# Patient Record
Sex: FEMALE | Race: WHITE | NOT HISPANIC OR LATINO | Employment: OTHER | URBAN - METROPOLITAN AREA
[De-identification: names, ages, dates, MRNs, and addresses within clinical notes are randomized per-mention and may not be internally consistent; named-entity substitution may affect disease eponyms.]

---

## 2017-01-05 ENCOUNTER — ALLSCRIPTS OFFICE VISIT (OUTPATIENT)
Dept: OTHER | Facility: OTHER | Age: 62
End: 2017-01-05

## 2017-01-13 ENCOUNTER — LAB REQUISITION (OUTPATIENT)
Dept: LAB | Facility: HOSPITAL | Age: 62
End: 2017-01-13
Payer: COMMERCIAL

## 2017-01-13 DIAGNOSIS — G89.29 OTHER CHRONIC PAIN: ICD-10-CM

## 2017-01-13 DIAGNOSIS — M48.02 SPINAL STENOSIS OF CERVICAL REGION: ICD-10-CM

## 2017-01-13 PROCEDURE — 80307 DRUG TEST PRSMV CHEM ANLYZR: CPT | Performed by: FAMILY MEDICINE

## 2017-01-13 PROCEDURE — CPT80301 HB MISC LAB SENDOUT: Performed by: FAMILY MEDICINE

## 2017-01-18 ENCOUNTER — GENERIC CONVERSION - ENCOUNTER (OUTPATIENT)
Dept: OTHER | Facility: OTHER | Age: 62
End: 2017-01-18

## 2017-01-18 LAB
AMPHETAMINES UR QL SCN: NEGATIVE NG/ML
BARBITURATES UR QL SCN: NEGATIVE NG/ML
BENZODIAZ UR QL SCN: POSITIVE
BZE UR QL SCN: NEGATIVE NG/ML
CANNABINOIDS UR QL SCN: POSITIVE
METHADONE UR QL SCN: NEGATIVE NG/ML
OPIATES UR QL: POSITIVE
PCP UR QL: NEGATIVE NG/ML
PROPOXYPH UR QL: NEGATIVE NG/ML

## 2017-01-25 LAB
OXYCODONE+OXYMORPHONE UR QL SCN: NORMAL
OXYCODONE+OXYMORPHONE UR QL SCN: NORMAL NG/ML

## 2017-02-15 ENCOUNTER — ALLSCRIPTS OFFICE VISIT (OUTPATIENT)
Dept: OTHER | Facility: OTHER | Age: 62
End: 2017-02-15

## 2017-05-02 ENCOUNTER — ALLSCRIPTS OFFICE VISIT (OUTPATIENT)
Dept: OTHER | Facility: OTHER | Age: 62
End: 2017-05-02

## 2017-05-20 ENCOUNTER — GENERIC CONVERSION - ENCOUNTER (OUTPATIENT)
Dept: OTHER | Facility: OTHER | Age: 62
End: 2017-05-20

## 2017-06-06 ENCOUNTER — ALLSCRIPTS OFFICE VISIT (OUTPATIENT)
Dept: OTHER | Facility: OTHER | Age: 62
End: 2017-06-06

## 2017-06-06 DIAGNOSIS — Z12.31 ENCOUNTER FOR SCREENING MAMMOGRAM FOR MALIGNANT NEOPLASM OF BREAST: ICD-10-CM

## 2017-06-06 DIAGNOSIS — R63.5 ABNORMAL WEIGHT GAIN: ICD-10-CM

## 2017-06-21 ENCOUNTER — GENERIC CONVERSION - ENCOUNTER (OUTPATIENT)
Dept: OTHER | Facility: OTHER | Age: 62
End: 2017-06-21

## 2017-07-12 ENCOUNTER — DOCTOR'S OFFICE (OUTPATIENT)
Dept: URBAN - NONMETROPOLITAN AREA CLINIC 1 | Facility: CLINIC | Age: 62
Setting detail: OPHTHALMOLOGY
End: 2017-07-12
Payer: COMMERCIAL

## 2017-07-12 DIAGNOSIS — F17.200: ICD-10-CM
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
## 2017-07-12 DIAGNOSIS — H25.13: ICD-10-CM

## 2017-07-12 DIAGNOSIS — G43.809: ICD-10-CM

## 2017-07-12 DIAGNOSIS — Z79.891: ICD-10-CM

## 2017-07-12 PROCEDURE — NO CHARGE N/C PROFESSIONAL COURTESY: Performed by: OPHTHALMOLOGY

## 2017-07-12 ASSESSMENT — REFRACTION_MANIFEST
OS_VA1: 20/
OD_ADD: +2.50
OD_CYLINDER: -1.00
OD_VA3: 20/
OS_VA2: 20/
OS_VA3: 20/
OU_VA: 20/
OD_VA3: 20/
OS_VA3: 20/
OS_VA1: 20/25
OS_VA2: 20/
OD_VA2: 20/20
OD_VA3: 20/
OU_VA: 20/
OU_VA: 20/
OS_SPHERE: PL
OD_SPHERE: -2.75
OD_VA2: 20/
OD_AXIS: 160
OS_VA1: 20/
OD_VA1: 20/
OS_VA3: 20/
OS_CYLINDER: -2.50
OD_VA1: 20/
OS_ADD: +2.50
OD_VA1: 20/25
OS_AXIS: 180
OS_VA2: 20/20
OD_VA2: 20/

## 2017-07-12 ASSESSMENT — REFRACTION_AUTOREFRACTION
OS_SPHERE: +0.75
OS_CYLINDER: -2.50
OD_SPHERE: -1.75
OD_CYLINDER: -1.50
OD_AXIS: 161
OS_AXIS: 170

## 2017-07-12 ASSESSMENT — REFRACTION_CURRENTRX
OS_AXIS: 180
OD_OVR_VA: 20/
OS_VPRISM_DIRECTION: SV
OD_VPRISM_DIRECTION: SV
OS_OVR_VA: 20/
OD_SPHERE: -2.75
OS_SPHERE: -0.25
OS_OVR_VA: 20/
OS_OVR_VA: 20/
OD_OVR_VA: 20/
OS_CYLINDER: -2.75
OD_AXIS: 175
OD_OVR_VA: 20/
OD_CYLINDER: -1.75

## 2017-07-12 ASSESSMENT — VISUAL ACUITY
OS_BCVA: 20/150
OD_BCVA: 20/30-2

## 2017-07-12 ASSESSMENT — SPHEQUIV_DERIVED
OD_SPHEQUIV: -2.5
OS_SPHEQUIV: -0.5
OD_SPHEQUIV: -3.25

## 2017-09-19 ENCOUNTER — ALLSCRIPTS OFFICE VISIT (OUTPATIENT)
Dept: OTHER | Facility: OTHER | Age: 62
End: 2017-09-19

## 2017-10-13 ENCOUNTER — GENERIC CONVERSION - ENCOUNTER (OUTPATIENT)
Dept: OTHER | Facility: OTHER | Age: 62
End: 2017-10-13

## 2017-10-19 ENCOUNTER — GENERIC CONVERSION - ENCOUNTER (OUTPATIENT)
Dept: OTHER | Facility: OTHER | Age: 62
End: 2017-10-19

## 2017-11-03 ENCOUNTER — GENERIC CONVERSION - ENCOUNTER (OUTPATIENT)
Dept: OTHER | Facility: OTHER | Age: 62
End: 2017-11-03

## 2017-12-11 ENCOUNTER — ALLSCRIPTS OFFICE VISIT (OUTPATIENT)
Dept: OTHER | Facility: OTHER | Age: 62
End: 2017-12-11

## 2017-12-11 DIAGNOSIS — C34.90 MALIGNANT NEOPLASM OF UNSPECIFIED PART OF UNSPECIFIED BRONCHUS OR LUNG (HCC): ICD-10-CM

## 2017-12-12 NOTE — PROGRESS NOTES
Assessment    1  Hypertension (401 9) (I10)  2  Lung cancer (162 9) (C34 90)  3  Generalized osteoarthritis of multiple sites (715 09) (M15 9)    Plan  Acute exacerbation of chronic obstructive airways disease with asthma    · Stop: Azithromycin 250 MG Oral Tablet  COPD (chronic obstructive pulmonary disease)    · Stop: Benzonatate 100 MG Oral Capsule  Generalized osteoarthritis of multiple sites, Knee pain, right    · Renew: Morphine Sulfate ER 15 MG Oral Tablet Extended Release; Take 1 tablet every 4hours for cancer related pain  Knee pain, right    · Renew: OxyCODONE HCl - 30 MG Oral Tablet; TAKE 1 TABLET EVERY 4 HOURS ASNEEDED FOR PAIN    Discussion/Summary  Possible side effects of new medications were reviewed with the patient/guardian today  The treatment plan was reviewed with the patient/guardian  The patient/guardian understands and agrees with the treatment plan      Chief Complaint  FOLLOW UP   Patient is here today for follow up of chronic conditions described in HPI  History of Present Illness  Her mother is in the hospital  She is tearful  This is very difficulty for her  As usual, she has a long rambling history involving family members that I have not met   has long-standing chronic pain  She has cervical stenosis and Charcot joints  She states that her pain is not well controlled on her current regimen  She was 1  on 60 mg of Morphine and 180 mg of oxycodone daily  She states that she missed a number of doses of morphine and discontinued them on her own  1  She has opioid induced constipation  She 1  has fair results with Movantik  follows with oncology and radiation oncology for her history of lung CA  She has no weight loss and no hemoptysis  She requests we order a PET scan for f/u 1        1 Amended By: Laura Wagner; Dec 11 2017 11:08 AM EST    Review of Systems   Constitutional: No fever, no chills, feels well, no tiredness, no recent weight gain or weight loss    Eyes: No complaints of eye pain, no red eyes, no eyesight problems, no discharge, no dry eyes, no itching of eyes  ENT: no complaints of earache, no loss of hearing, no nose bleeds, no nasal discharge, no sore throat, no hoarseness  Cardiovascular: No complaints of slow heart rate, no fast heart rate, no chest pain, no palpitations, no leg claudication, no lower extremity edema  Respiratory: No complaints of shortness of breath, no wheezing, no cough, no SOB on exertion, no orthopnea, no PND  Gastrointestinal: No complaints of abdominal pain, no constipation, no nausea or vomiting, no diarrhea, no bloody stools  Genitourinary: No complaints of dysuria, no incontinence, no pelvic pain, no dysmenorrhea, no vaginal discharge or bleeding  Musculoskeletal: No complaints of arthralgias, no myalgias, no joint swelling or stiffness, no limb pain or swelling  Integumentary: No complaints of skin rash or lesions, no itching, no skin wounds, no breast pain or lump  Neurological: No complaints of headache, no confusion, no convulsions, no numbness, no dizziness or fainting, no tingling, no limb weakness, no difficulty walking  Psychiatric: Not suicidal, no sleep disturbance, no anxiety or depression, no change in personality, no emotional problems  Endocrine: No complaints of proptosis, no hot flashes, no muscle weakness, no deepening of the voice, no feelings of weakness  Hematologic/Lymphatic: No complaints of swollen glands, no swollen glands in the neck, does not bleed easily, does not bruise easily  Active Problems    1  Abnormal weight gain (783 1) (R63 5)  2  Acute exacerbation of chronic obstructive airways disease with asthma (493 22) (J44 1,J45 901)  3  Acute vaginitis (616 10) (N76 0)  4  Allergic rhinitis (477 9) (J30 9)  5  Cellulitis (682 9) (L03 90)  6  Cervical stenosis of spine (723 0) (M48 02)  7  Constipation (564 00) (K59 00)  8  COPD (chronic obstructive pulmonary disease) (496) (J44 9)  9   Depression (311) (F32 9)  10  Edema (782 3) (R60 9)  11  Encounter for screening mammogram for malignant neoplasm of breast (V76 12)  (Z12 31)  12  Generalized osteoarthritis of multiple sites (715 09) (M15 9)  13  Head trauma (959 01) (S09 90XA)  14  Hypertension (401 9) (I10)  15  Hypomagnesemia (275 2) (E83 42)  16  Hyponatremia (276 1) (E87 1)  17  IBS (irritable bowel syndrome) (564 1) (K58 9)  18  Knee pain, right (719 46) (M25 561)  19  Lactose intolerance (271 3) (E73 9)  20  Lung cancer (162 9) (C34 90)  21  Lupus (695 4) (L93 0)  22  Mild protein-calorie malnutrition (263 1) (E44 1)  23  Motion sickness (994 6) (T75 3XXA)  24  Nausea (787 02) (R11 0)  25  Other chronic pain (338 29) (G89 29)  26  Other muscle spasm (728 85) (M62 838)  27  Plantar fasciitis (728 71) (M72 2)  28  Skin rash (782 1) (R21)  29  Smoking trying to quit (305 1) (Z72 0)  30  Tourette's (307 23) (F95 2)  31  Varicose veins with swelling (454 8) (U43 809)    Past Medical History  1  History of Allergic conjunctivitis (372 14) (H10 10)  2  History of Charcot's joint of shoulder (094 0,713 5) (M14 619)  3  History of acute sinusitis (V12 69) (Z87 09)  4  History of candidiasis of mouth (V12 09) (Z86 19)  5  History of malignant melanoma of skin (V10 82) (Z85 820)  6  History of migraine (V12 49) (Z86 69)  7  History of nausea (V12 79) (Z87 898)  8  History of Nausea and vomiting, vomiting of unspecified type  9  History of Need for influenza vaccination (V04 81) (Z23)  10  History of Yeast infection (112 9) (B37 9)    The active problems and past medical history were reviewed and updated today  1        1 Amended By: Gem Figueroa; Dec 11 2017 11:08 AM EST    Surgical History  1  History of Arthrodesis Cervical  2  History of Hip Replacement  3  History of Rhinoplasty  4  History of Tonsillectomy With Adenoidectomy  5  History of Total Abdominal Hysterectomy With Removal Of Both Tubes    The surgical history was reviewed and updated today   1        1 Amended By: David Macedo; Dec 11 2017 11:08 AM EST    Family History  Mother   1  Family history of cardiac disorder (V17 49) (Z82 49)  2  Family history of leukemia (V16 6) (Z80 6)  Father   3  Family history of leukemia (V16 6) (Z80 6)    The family history was reviewed and updated today  1        1 Amended By: David Macedo; Dec 11 2017 11:08 AM EST    Social History     · Current every day smoker (305 1) (F17 200)   · Smoking trying to quit (305 1) (Z72 0)  The social history was reviewed and updated today  1  The social history was reviewed and is unchanged  1        1 Amended By: David Macedo; Dec 11 2017 11:09 AM EST    Current Meds  1  Azithromycin 250 MG Oral Tablet; take 2 tablets by mouth today then take 1 tablet DAILY FOR 4 DAYS; Therapy: 92YPQ5921 to (Evaluate:38Fwi8953)  Requested for: 11ZBR4469; Last Rx:21Syi4787 Ordered  2  Benzonatate 100 MG Oral Capsule; TAKE 1 CAPSULE 3 TIMES DAILY AS NEEDED; Therapy: 51Kfm8776 to (Evaluate:23Ugq0416)  Requested for: 53Sjf7877; Last Rx:56Fbl7573 Ordered  3  Boost High Protein Oral Liquid; USE AS DIRECTED; Therapy: 66Jng0388 to (Last Rx:18Nov2016)  Requested for: 74MOD2850 Ordered  4  Cetirizine HCl - 10 MG Oral Tablet; take 1 tablet by mouth once daily; Therapy: 92Pln6647 to (Evaluate:41Czt4595)  Requested for: 01RHG2164; Last Rx:18Nov2016 Ordered  5  Claritin 10 MG Oral Tablet; take 1 tablet by mouth every day; Therapy: 83Rda2438 to (Last AO:32HQD5307)  Requested for: 87MRN2928 Ordered  6  Clobetasol Propionate 0 05 % External Ointment; APPLY AND GENTLY MASSAGE INTO AFFECTED AREA(S) TWICE DAILY; Therapy: 15TSM7945 to (Last Rx:07Jan2016)  Requested for: 12YMB5514 Ordered  7  ClonazePAM 2 MG Oral Tablet; TAKE 1 TABLET THREE TIMES A DAY; Therapy: 13NUD5372 to (Evaluate:70Ntj2568)  Requested for: 46Tas0292; Last Rx:25Xpd6559 Ordered  8  Cromolyn Sodium 4 % Ophthalmic Solution; INSTILL 1 DROP INTO EACH EYE 4 TIMES DAILY AT REGULAR INTERVALS;  Therapy: 30Apr2015 to (Last Rx:30Apr2015)  Requested for: 22Cez1101 Ordered  9  DiazePAM 5 MG Oral Tablet; TAKE 1 TABLET BY MOUTH FOUR TIMES A DAY IF NEEDED; Therapy: 07JDV6893 to (Evaluate:17Jan2018)  Requested for: 68FTE9309; Last Rx:51Orf2337 Ordered  10  Docusate Sodium 250 MG Oral Capsule; TAKE 1 CAPSULE TWICE DAILY; Therapy: 48SFY1338 to (Last Rx:18Nov2016)  Requested for: 82JTV8954 Ordered  11  Fexofenadine HCl - 180 MG Oral Tablet; TAKE 1 TABLET DAILY AS NEEDED; Therapy: 55CVG0928 to (Evaluate:02Jan2018)  Requested for: 55DXJ5180; Last  Rx:10Pwl6125 Ordered  12  Fluticasone Propionate 50 MCG/ACT Nasal Suspension; instill 2 sprays into each nostril  once daily; Therapy: 00Irk3800 to (Evaluate:15Jun2016)  Requested for: 10ZZQ9777; Last  Rx:44Lsx8909 Ordered  13  Furosemide 40 MG Oral Tablet; take 2 tablets by mouth daily; Therapy: 79GYB6514 to (Evaluate:59Yto3988)  Requested for: 15VBP2377; Last  Rx:10Tht8807 Ordered  14  Ibuprofen 800 MG Oral Tablet; TAKE 1 TABLET 4 TIMES DAILY AS NEEDED; Therapy: 37SKP5571 to (Evaluate:10Jun2017)  Requested for: 37IWD7480; Last  Rx:11Jan2017 Ordered  15  Lactaid 3000 UNIT Oral Tablet; TAKE 1 TO 3 TABLETS WITH FIRST BITE OF DAIRY  FOOD; Therapy: 08Vcb1447 to (Zenia Alert)  Requested for: 97Aoy3214; Last  Rx:09Gng8943 Ordered  16  Loratadine 10 MG Oral Tablet; take 1 tablet by mouth once daily; Therapy: 92MCA8324 to (Austen Bitters)  Requested for: 11VDX6371; Last  LN:38BHV5527 Ordered  17  Magnesium Oxide 250 MG Oral Tablet; Take one tablet twice daily; Therapy: 98Wig3757 to (Last Rx:18Nov2016)  Requested for: 72TBB7917 Ordered  18  MetOLazone 5 MG Oral Tablet; TAKE 1 TABLET BY MOUTH EVERY OTHER DAY; Therapy: 59Mgz7964 to (Evaluate:78Zqb3532)  Requested for: 29Yhm8444; Last  Rx:73Mnl0611 Ordered  19  Metoprolol Succinate  MG Oral Tablet Extended Release 24 Hour; take 1/2 tablet  daily;   Therapy: 71DCW2806 to [phone])  Requested for: 07FQS0300; Last OR:52GTM0345 Ordered  20  Morphine Sulfate ER 15 MG Oral Tablet Extended Release; Take 1 tablet every 4 hours  for cancer related pain; Therapy: 05Lzs4074 to (Last Rx:03Jvv7447) Ordered  21  Movantik 25 MG Oral Tablet; Take 1 tablet 1 hour prior or 2 hours after morning meal;  Therapy: 83SAP3013 to (Last Rx:43Cgq0866)  Requested for: 22VMJ9289 Ordered  22  OxyCODONE HCl - 30 MG Oral Tablet; TAKE 1 TABLET EVERY 4 HOURS AS NEEDED  FOR PAIN;  Therapy: 09EVG9155 to (Evaluate:2017); Last Rx:47Vuw6059 Ordered  23  Pantoprazole Sodium 40 MG Oral Tablet Delayed Release; take 1 tablet by mouth once  daily; Therapy: 71ZHG2363 to [phone])  Requested for: 35HGF3778; Last  Rx:87Vwi1924 Ordered  24  Peppermint [employer]; take as directed; Therapy: 69Wsw7718 to (Last Rx:15Vzx1300)  Requested for: 35Ylp4684 Ordered  25  Pimozide 2 MG Oral Tablet; take 1 tablet by mouth three times a day as directed; Therapy: 52VMM9948 to (Evaluate:00Dys7005)  Requested for: 44JAD5061; Last  74SMO9498 Ordered  26  Potassium Chloride Sis ER 20 MEQ Oral Tablet Extended Release; TAKE 1 TABLET 3  TIMES DAILY; Therapy: 32XMZ7260 to (Valerie Hamilton)  Requested for: 12NLK1271; Last  Rx:59Lxp9745 Ordered  27  PredniSONE 10 MG Oral Tablet; 3 tablets daily for 3 days, then  2 tablets daily for 3 days, then  1 tablet daily for 3 days; Therapy: 77EIK7904 to (Last Rx:49Vzl5280) Ordered  28  Premarin 1 25 MG Oral Tablet; take 1 tablet by mouth once daily; Therapy: 33HAA8974 to (Markle Done)  Requested for: 75Xps8350; Last  Rx:04Lyu7790 Ordered  29  RA Col-Rite 250 MG Oral Capsule; take 1 capsule by mouth twice a day; Therapy: 91BTM9674 to (Evaluate:04Dcc4233)  Requested for: 57JKH0975; Last  Rx:06Hcj4459 Ordered  30  Senna Lax 8 6 MG TABS; TAKE AS DIRECTED; Therapy: 43GXI6273 to (Last Rx:2015)  Requested for: 15EDP5332 Ordered  31  Sertraline HCl - 100 MG Oral Tablet; TAKE 1 TABLET DAILY;   Therapy: 60WMQ9486 to (August Gibbs)  Requested for: 06JRT4735; Last  Rx:03Nov2017 Ordered  32  Simethicone 180 MG Oral Capsule; TAKE 1 CAPSULE 4 TIMES DAILY AS NEEDED; Therapy: 75Ovr7827 to (Evaluate:08Cmx6860)  Requested for: 21DYJ2086; Last  Rx:10Nov2015 Ordered  33  Triamcinolone Acetonide 0 1 % External Cream; APPLY  AND RUB  IN A THIN FILM TO  AFFECTED AREAS TWICE DAILY  (AM AND PM); Therapy: 59TUW8900 to (Last Rx:30Mar2015)  Requested for: 13UJR5720 Ordered  34  Valerian Root 500 MG Oral Capsule; TAKE AS DIRECTED; Therapy: 75Riy6212 to (Last Rx:54Aia1876)  Requested for: 08Pvj7267 Ordered  35  Ventolin  (90 Base) MCG/ACT Inhalation Aerosol Solution; inhale 1 to 2 puffs by  mouth every 4 to 6 hours if needed; Therapy: 39YFM5974 to (Vasile Nunes)  Requested for: 29KSN3380; Last  Rx:18Nov2016 Ordered    The medication list was reviewed and updated today  1        1 Amended By: Steve Alberts; Dec 11 2017 11:09 AM EST    Allergies  1  Ciprofloxacin HCl TABS  2  Keflex TABS  3  Penicillins  4  Eggs  5  Feather/Down    Vitals  Vital Signs    Recorded: 81Jdi8801 10:23AM   Heart Rate 67   Respiration 16   Systolic 730   Diastolic 64   Height 5 ft 6 in   Weight 139 lb    BMI Calculated 22 44   BSA Calculated 1 71   O2 Saturation 97       Physical Exam   Constitutional  General appearance: No acute distress, well appearing and well nourished  Pulmonary  Respiratory effort: No increased work of breathing or signs of respiratory distress  Auscultation of lungs: Clear to auscultation  Cardiovascular  Auscultation of heart: Normal rate and rhythm, normal S1 and S2, without murmurs  Examination of extremities for edema and/or varicosities: Normal    Abdomen  Abdomen: Non-tender, no masses  Liver and spleen: No hepatomegaly or splenomegaly           Signatures   Electronically signed by : Domingo Martinez MD; Dec 11 2017 11:05AM EST                       (Author)    Electronically signed by : Domingo Martinez MD; Dec 11 2017 11:09AM EST                       (Author)

## 2018-01-10 NOTE — PROGRESS NOTES
Assessment    1  Cervical stenosis of spine (723 0) (M48 02)   2  Generalized osteoarthritis of multiple sites (715 09) (M15 9)    Plan  Cervical stenosis of spine    · From  OxyCODONE HCl - 15 MG Oral Tablet TAKE 1-2 TABLETS EVERY 4-6  HOURS AS NEEDED FOR PAIN To OxyCODONE HCl - 20 MG Oral Tablet take 1 tablet  every 4-6 hours for the next 10 days  Generalized osteoarthritis of multiple sites    · Flector 1 3 % Transdermal Patch; APPLY PATCH TO AFFECTED AREA TWICE DAILY  Generalized osteoarthritis of multiple sites, Knee pain, right    · Morphine Sulfate ER 15 MG Oral Tablet Extended Release; Take 3 tablets twice  a day    Discussion/Summary    I had a long discussion with her regarding her pain medications  I wrote for separate letters with her in the office asking for formulary exceptions for both the dosage and quantity of her narcotic preparations  She has a number of chronic conditions requiring pain medication  I do not believe that she is overusing her medication  I do not believe that she has a substance abuse issue or is diverting her medication  Chief Complaint  pt states she has "jumping bones" and discuss meds      History of Present Illness  "I am in so much pain " She c/o muscle spasms, ie  "jumping bones "     She has a number of diagnoses related to her chronic pain  She has significant spinal stenosis and herniated discs  She had a Charcot joint  She has muscle spasm throughout her neck, back, and upper extremities  She came to our office on high-dose narcotic medications  She frequently self adjusts her medications do to her ongoing ambulance was pain  Most recently, she was diagnosed with lung cancer  She's undergoing treatment for this currently  His only escalated her pain  In addition, she has peripheral neuropathy  This is been most difficult to treat  She is on high dose of Klonopin  She states that she takes this mostly for her Tourette's syndrome   She takes Valium for muscle spasms  Review of Systems    Constitutional: No fever, no chills, feels well, no tiredness, no recent weight gain or weight loss  Eyes: No complaints of eye pain, no red eyes, no eyesight problems, no discharge, no dry eyes, no itching of eyes  ENT: no complaints of earache, no loss of hearing, no nose bleeds, no nasal discharge, no sore throat, no hoarseness  Cardiovascular: No complaints of slow heart rate, no fast heart rate, no chest pain, no palpitations, no leg claudication, no lower extremity edema  Respiratory: No complaints of shortness of breath, no wheezing, no cough, no SOB on exertion, no orthopnea, no PND  Gastrointestinal: No complaints of abdominal pain, no constipation, no nausea or vomiting, no diarrhea, no bloody stools  Genitourinary: No complaints of dysuria, no incontinence, no pelvic pain, no dysmenorrhea, no vaginal discharge or bleeding  Musculoskeletal: as noted in HPI  Integumentary: No complaints of skin rash or lesions, no itching, no skin wounds, no breast pain or lump  Neurological: No complaints of headache, no confusion, no convulsions, no numbness, no dizziness or fainting, no tingling, no limb weakness, no difficulty walking  Psychiatric: Not suicidal, no sleep disturbance, no anxiety or depression, no change in personality, no emotional problems  Endocrine: No complaints of proptosis, no hot flashes, no muscle weakness, no deepening of the voice, no feelings of weakness  Hematologic/Lymphatic: No complaints of swollen glands, no swollen glands in the neck, does not bleed easily, does not bruise easily  Active Problems    1  Acute vaginitis (616 10) (N76 0)   2  Allergic rhinitis (477 9) (J30 9)   3  Cellulitis (682 9) (L03 90)   4  Cervical stenosis of spine (723 0) (M48 02)   5  Constipation (564 00) (K59 00)   6  COPD (chronic obstructive pulmonary disease) (496) (J44 9)   7  Depression (311) (F32 9)   8  Edema (782 3) (R60 9)   9   Encounter for screening for malignant neoplasm of colon (V76 51) (Z12 11)   10  Encounter for screening mammogram for malignant neoplasm of breast (V76 12)    (Z12 31)   11  Generalized osteoarthritis of multiple sites (715 09) (M15 9)   12  Hypertension (401 9) (I10)   13  Hypomagnesemia (275 2) (E83 42)   14  Hyponatremia (276 1) (E87 1)   15  IBS (irritable bowel syndrome) (564 1) (K58 9)   16  Knee pain, right (719 46) (M25 561)   17  Lupus (710 0) (M32 9)   18  Motion sickness (994 6) (T75 3XXA)   19  Nausea (787 02) (R11 0)   20  Need for pneumococcal vaccination (V03 82) (Z23)   21  Other chronic pain (338 29) (G89 29)   22  Other muscle spasm (728 85) (M62 838)   23  Plantar fasciitis (728 71) (M72 2)   24  Skin rash (782 1) (R21)   25  Tourette's (307 23) (F95 2)    Past Medical History    1  History of Allergic conjunctivitis (372 14) (H10 10)   2  History of Charcot's joint of shoulder (094 0,713 5) (M14 619)   3  History of acute sinusitis (V12 69) (Z87 09)   4  History of candidiasis of mouth (V12 09) (Z86 19)   5  History of malignant melanoma of skin (V10 82) (Z85 820)   6  History of migraine (V12 49) (Z86 69)   7  History of nausea (V12 79) (Z87 898)   8  History of Nausea and vomiting, vomiting of unspecified type (787 01) (R11 2)   9  History of Need for influenza vaccination (V04 81) (Z23)   10  History of Yeast infection (112 9) (B37 9)    Surgical History    1  History of Arthrodesis Cervical   2  History of Hip Replacement   3  History of Rhinoplasty   4  History of Tonsillectomy With Adenoidectomy   5  History of Total Abdominal Hysterectomy With Removal Of Both Tubes    Family History    1  Family history of cardiac disorder (V17 49) (Z82 49)   2  Family history of leukemia (V16 6) (Z80 6)    3  Family history of leukemia (V16 6) (Z80 6)    Social History    · Current every day smoker (305 1) (F17 200)    Current Meds   1  CeleXA 40 MG Oral Tablet;    Therapy: 81TCN1517 to (Evaluate:15Mar2016)  Requested for: 73Ayy1095 Recorded   2  Claritin 10 MG Oral Tablet; take 1 tablet by mouth daily; Therapy: 19JCS4194 to (Last Rx:10Nov2015)  Requested for: 19VOY4023 Ordered   3  Claritin 10 MG Oral Tablet; take 1 tablet by mouth every day; Therapy: 47Qxb4519 to (Last Rx:16Apr2015)  Requested for: 56Mac7121 Ordered   4  Clobetasol Propionate 0 05 % External Ointment; APPLY AND GENTLY MASSAGE INTO   AFFECTED AREA(S) TWICE DAILY; Therapy: 84YIE9917 to (Last Rx:07Jan2016)  Requested for: 26NXA2893 Ordered   5  ClonazePAM 2 MG Oral Tablet; TAKE 1 TABLET 3 TIMES DAILY; Last NX:37FYH5030   Ordered   6  Cromolyn Sodium 4 % Ophthalmic Solution; INSTILL 1 DROP INTO EACH EYE 4 TIMES   DAILY AT REGULAR INTERVALS; Therapy: 44HPZ1008 to (Last Rx:10Cmz4376)  Requested for: 73YDS3509 Ordered   7  Cromolyn Sodium 4 % Ophthalmic Solution; INSTILL 1 DROP INTO EACH EYE 4 TIMES   DAILY AT REGULAR INTERVALS; Therapy: 30Apr2015 to (Last Rx:30Apr2015)  Requested for: 95Aof8304 Ordered   8  Diazepam 5 MG Oral Tablet; take 1 tablet by mouth four times a day; Therapy: 54DLV9310 to (Ollie Barahona)  Requested for: 98IOR0818; Last   Rx:48Kty5605 Ordered   9  Docusate Sodium 250 MG Oral Capsule; TAKE 1 CAPSULE TWICE DAILY; Therapy: 61AVU1602 to (Last Rx:30Mar2015)  Requested for: 59VPA6204 Ordered   10  Fluticasone Propionate 50 MCG/ACT Nasal Suspension; instill 2 sprays into each nostril    once daily; Therapy: 49Xty9826 to (Evaluate:15Jun2016)  Requested for: 17RTY7413; Last    Rx:89Ebu9975 Ordered   11  Furosemide 40 MG Oral Tablet; TAKE 2 TABLETS DAILY; Therapy: 92IET8754 to (Evaluate:10Mar2016)  Requested for: 24VXH4002; Last    Rx:16Mar2015 Ordered   12  Ibuprofen 800 MG Oral Tablet; TAKE 1 TABLET 4 TIMES DAILY AS NEEDED; Therapy: 26JPJ3015 to ([facility])  Requested for: 91UAI8465; Last    Rx:10Nov2015 Ordered   13  Ketoconazole 2 % External Cream; APPLY SPARINGLY TO AFFECTED AREA(S) TWICE    DAILY;     Therapy: 13CCN0232 to (Last Rx:10Nov2015)  Requested for: 21XKS2842 Ordered   14  Magnesium 400 MG Oral Capsule; 1 capsule twice a day; Therapy: 67Eky6284 to (Last Rx:58Aic6710)  Requested for: 98Yoq1740 Ordered   15  Magnesium Oxide 250 MG Oral Tablet; Take one tablet twice daily; Therapy: 35Jvs6311 to (Last Rx:06Ssk3428)  Requested for: 25Okl3822 Ordered   16  Metolazone 5 MG Oral Tablet; TAKE 1 TABLET EVERY OTHER DAY; Therapy: 41Mpo3300 to (Last Rx:30Dpm3706)  Requested for: 23Fkv5518 Ordered   17  Metoprolol Succinate  MG Oral Tablet Extended Release 24 Hour; take 0 5 tablet    daily; Therapy: 51BAE9838 to (Evaluate:57Bet6449)  Requested for: 57Wzo2208; Last    Rx:43Mzo5264 Ordered   18  Morphine Sulfate ER 15 MG Oral Tablet Extended Release; Take 3 tablets three times a    day; Therapy: 43URG0614 to (Last QO:88OAO6305) Ordered   19  Morphine Sulfate ER 15 MG Oral Tablet Extended Release; Take 3 tablets twice a day; Therapy: 37Pfd3539 to (Last Rx:69Rfd1534) Ordered   20  Movantik 25 MG Oral Tablet; Take 1 tablet 1 hour prior or 2 hours after morning meal;    Therapy: 44UGL5956 to (Last Rx:09Eng2254)  Requested for: 75WZK9245 Ordered   21  Nystatin 508337 UNIT/ML Mouth/Throat Suspension; swish and swallow 1 teaspoonful    by mouth three times a day; Therapy: 10ZAS4170 to [phone])  Requested for: 444 14 907; Last    Rx:81Zzx2356 Ordered   22  Ondansetron 4 MG Oral Tablet Dispersible; 1 tablet daily every 8 hours as needed; Therapy: 32NDE1851 to (Last Rx:73Psh3029)  Requested for: 09MSL8041 Ordered   23  Ondansetron 4 MG Oral Tablet Dispersible; 1 tablet daily every 8 hours as needed; Therapy: 34BQG2105 to (Last Rx:10Nov2015)  Requested for: 33JJH9472 Ordered   24  Orap 2 MG Oral Tablet; Take 1 tablet three times daily as directed; Therapy: 91SWX3498 to (Last Rx:43Ysl4856)  Requested for: 34LTF7974 Ordered   25  OxyCODONE HCl - 15 MG Oral Tablet;  Take 1 tablet every 4-6 hours as needed for pain; Therapy: 83Agb1791 to (Last US:37WMO6364) Ordered   26  Peppermint [employer]; take as directed; Therapy: 72Zne7169 to (Last Rx:16Apr2015)  Requested for: 77Dpi4753 Ordered   27  Pimozide 2 MG Oral Tablet; take 1 tablet by mouth three times a day as directed; Therapy: 34BNL1400 to (Evaluate:06Gxb0038)  Requested for: 82XZI5290; Last    Rx:36Zme2214 Ordered   28  Potassium Chloride Sis ER 20 MEQ Oral Tablet Extended Release; TAKE 1 TABLET 3    TIMES DAILY; Therapy: 26LKM9512 to ([place])  Requested for: 40LJG8717; Last    Rx:41Ovh9341 Ordered   29  Premarin 1 25 MG Oral Tablet; TAKE 1 TABLET DAILY; Therapy: 94TYJ5901 to (Evaluate:15Apr2015) Recorded   30  Senna Lax 8 6 MG Oral Tablet; TAKE AS DIRECTED; Therapy: 52SBD2103 to (Last Rx:18Mar2015)  Requested for: 21UDJ5809 Ordered   31  Simethicone 180 MG Oral Capsule; TAKE 1 CAPSULE 4 TIMES DAILY AS NEEDED; Therapy: 78Vxf2775 to (Evaluate:28Wza4860)  Requested for: 37AXC4280; Last    Rx:10Nov2015 Ordered   32  Transderm-Scop 1 MG/3DAYS Transdermal Patch 72 Hour; APPLY 1 PATCH EVERY 3    DAYS; Therapy: 60PKB5438 to (Last Rx:06Bbn5543)  Requested for: 98Qwr0069 Ordered   33  Triamcinolone Acetonide 0 1 % External Cream; APPLY  AND RUB  IN A THIN FILM TO    AFFECTED AREAS TWICE DAILY  (AM AND PM); Therapy: 05YHO6535 to (Last Rx:30Mar2015)  Requested for: 49KFR9546 Ordered   34  Valerian Root 500 MG Oral Capsule; TAKE AS DIRECTED; Therapy: 60Hmw8433 to (Last Rx:08Krs9608)  Requested for: 05Rac2035 Ordered   35  Ventolin  (90 Base) MCG/ACT Inhalation Aerosol Solution; inhale 1 to 2 puffs by    mouth every 4 to 6 hours if needed; Therapy: 37GEZ0636 to (Evaluate:39Ytk6178)  Requested for: 84VNY3746; Last    Rx:36Vkj1359 Ordered   36  Ventolin  (90 Base) MCG/ACT Inhalation Aerosol Solution; INHALE 1 TO 2 PUFFS    EVERY 4 TO 6 HOURS AS NEEDED;     Therapy: 69TEA8563 to (Last Rx:16Mar2015)  Requested for: 78UVH7273 Ordered    Allergies    1  Ciprofloxacin HCl TABS   2  Keflex TABS   3  Penicillins    4  Eggs   5  Feather/Down    Vitals  Vital Signs [Data Includes: Current Encounter]    Recorded: [id number] 11:23AM   Weight 147 lb    BMI Calculated 23 73   BSA Calculated 1 75     Physical Exam    Constitutional   General appearance: No acute distress, well appearing and well nourished  Pulmonary   Respiratory effort: No increased work of breathing or signs of respiratory distress  Auscultation of lungs: Clear to auscultation  Cardiovascular   Auscultation of heart: Normal rate and rhythm, normal S1 and S2, without murmurs  Examination of extremities for edema and/or varicosities: Normal     Abdomen   Abdomen: Non-tender, no masses  Liver and spleen: No hepatomegaly or splenomegaly           Signatures   Electronically signed by : Fahad Bergman MD; Feb 2 2016  9:24PM EST                       (Author)

## 2018-01-11 NOTE — MISCELLANEOUS
January 24, 2017      To Whom It May Concern,    Please excuse Ms Vargas from jury duty  She has chronic pain and limited mobility  She would be unable to sit through a trial     Please contact our office  if you have any questions or concerns  Sincerely,      SHANTAL Swain        Electronically signed by:Rehan King MD  Jan 24 2017 12:43PM EST

## 2018-01-13 VITALS
OXYGEN SATURATION: 96 % | RESPIRATION RATE: 15 BRPM | DIASTOLIC BLOOD PRESSURE: 70 MMHG | WEIGHT: 145 LBS | HEIGHT: 66 IN | SYSTOLIC BLOOD PRESSURE: 114 MMHG | BODY MASS INDEX: 23.3 KG/M2 | HEART RATE: 84 BPM

## 2018-01-14 VITALS
SYSTOLIC BLOOD PRESSURE: 118 MMHG | HEART RATE: 77 BPM | RESPIRATION RATE: 15 BRPM | HEIGHT: 66 IN | DIASTOLIC BLOOD PRESSURE: 62 MMHG | BODY MASS INDEX: 23.34 KG/M2 | OXYGEN SATURATION: 97 % | WEIGHT: 145.25 LBS

## 2018-01-14 VITALS
BODY MASS INDEX: 22.5 KG/M2 | HEIGHT: 66 IN | RESPIRATION RATE: 15 BRPM | SYSTOLIC BLOOD PRESSURE: 118 MMHG | HEART RATE: 63 BPM | OXYGEN SATURATION: 90 % | DIASTOLIC BLOOD PRESSURE: 60 MMHG | WEIGHT: 140 LBS

## 2018-01-14 VITALS
HEIGHT: 66 IN | DIASTOLIC BLOOD PRESSURE: 68 MMHG | SYSTOLIC BLOOD PRESSURE: 120 MMHG | RESPIRATION RATE: 16 BRPM | OXYGEN SATURATION: 96 % | HEART RATE: 75 BPM

## 2018-01-14 VITALS
RESPIRATION RATE: 17 BRPM | BODY MASS INDEX: 21.53 KG/M2 | DIASTOLIC BLOOD PRESSURE: 68 MMHG | HEART RATE: 76 BPM | OXYGEN SATURATION: 94 % | SYSTOLIC BLOOD PRESSURE: 112 MMHG | HEIGHT: 66 IN | WEIGHT: 134 LBS

## 2018-01-15 NOTE — MISCELLANEOUS
November 11, 2017        Dear Dr Oz Irene,    Thank you for seeing Ms Vargas in follow-up  I am sending you a copy of her medication list   In your note it states that she is taking oxycodone 10 mg three times  daily  She is actually taking oxycodone 30 mg up to 6 times a day and morphine sulfate ER 90 mg daily  Again, thank you for your help with her  Sincerely,    SHANTAL Ennis          Electronically signed by:Rehan Aguilar MD  Nov 12 2017  8:00PM EST

## 2018-01-22 VITALS
HEIGHT: 66 IN | SYSTOLIC BLOOD PRESSURE: 110 MMHG | RESPIRATION RATE: 17 BRPM | HEART RATE: 78 BPM | WEIGHT: 140.25 LBS | DIASTOLIC BLOOD PRESSURE: 70 MMHG | OXYGEN SATURATION: 98 % | BODY MASS INDEX: 22.54 KG/M2

## 2018-01-23 VITALS
OXYGEN SATURATION: 97 % | HEART RATE: 67 BPM | DIASTOLIC BLOOD PRESSURE: 64 MMHG | BODY MASS INDEX: 22.34 KG/M2 | SYSTOLIC BLOOD PRESSURE: 100 MMHG | HEIGHT: 66 IN | RESPIRATION RATE: 16 BRPM | WEIGHT: 139 LBS

## 2018-02-21 ENCOUNTER — TELEPHONE (OUTPATIENT)
Dept: FAMILY MEDICINE CLINIC | Facility: CLINIC | Age: 63
End: 2018-02-21

## 2018-03-04 DIAGNOSIS — R60.9 EDEMA, UNSPECIFIED TYPE: Primary | ICD-10-CM

## 2018-03-05 RX ORDER — METOLAZONE 5 MG/1
TABLET ORAL
Qty: 15 TABLET | Refills: 5 | Status: SHIPPED | OUTPATIENT
Start: 2018-03-05

## 2018-03-05 RX ORDER — ESTROGENS, CONJUGATED 1.25 MG
TABLET ORAL
Qty: 30 TABLET | Refills: 5 | Status: SHIPPED | OUTPATIENT
Start: 2018-03-05 | End: 2018-08-30 | Stop reason: SDUPTHER

## 2018-03-06 DIAGNOSIS — M62.838 MUSCLE SPASM: Primary | ICD-10-CM

## 2018-03-06 RX ORDER — DIAZEPAM 5 MG/1
TABLET ORAL
Qty: 120 TABLET | Refills: 2 | Status: SHIPPED | OUTPATIENT
Start: 2018-03-06 | End: 2018-05-04 | Stop reason: SDUPTHER

## 2018-03-12 ENCOUNTER — TELEPHONE (OUTPATIENT)
Dept: FAMILY MEDICINE CLINIC | Facility: CLINIC | Age: 63
End: 2018-03-12

## 2018-03-12 DIAGNOSIS — J32.9 SINUSITIS, UNSPECIFIED CHRONICITY, UNSPECIFIED LOCATION: Primary | ICD-10-CM

## 2018-03-12 RX ORDER — AZITHROMYCIN 250 MG/1
TABLET, FILM COATED ORAL
Qty: 6 TABLET | Refills: 0 | Status: SHIPPED | OUTPATIENT
Start: 2018-03-12 | End: 2018-03-16

## 2018-03-12 RX ORDER — AZITHROMYCIN 250 MG/1
TABLET, FILM COATED ORAL
COMMUNITY
Start: 2016-03-22 | End: 2018-09-21

## 2018-03-14 ENCOUNTER — TELEPHONE (OUTPATIENT)
Dept: FAMILY MEDICINE CLINIC | Facility: CLINIC | Age: 63
End: 2018-03-14

## 2018-03-14 DIAGNOSIS — R52 PAIN: Primary | ICD-10-CM

## 2018-03-14 RX ORDER — OXYCODONE HYDROCHLORIDE 30 MG/1
1 TABLET ORAL EVERY 4 HOURS PRN
COMMUNITY
Start: 2017-05-02 | End: 2018-03-14 | Stop reason: SDUPTHER

## 2018-03-14 RX ORDER — OXYCODONE HYDROCHLORIDE 30 MG/1
30 TABLET ORAL EVERY 4 HOURS PRN
Qty: 180 TABLET | Refills: 0 | Status: SHIPPED | OUTPATIENT
Start: 2018-03-14 | End: 2018-04-12 | Stop reason: SDUPTHER

## 2018-03-15 DIAGNOSIS — K21.9 GASTROESOPHAGEAL REFLUX DISEASE WITHOUT ESOPHAGITIS: Primary | ICD-10-CM

## 2018-03-15 RX ORDER — PANTOPRAZOLE SODIUM 40 MG/1
TABLET, DELAYED RELEASE ORAL
Qty: 30 TABLET | Refills: 4 | Status: SHIPPED | OUTPATIENT
Start: 2018-03-15 | End: 2019-01-22 | Stop reason: SDUPTHER

## 2018-03-15 RX ORDER — PANTOPRAZOLE SODIUM 40 MG/1
TABLET, DELAYED RELEASE ORAL
Qty: 30 TABLET | Refills: 4 | Status: SHIPPED | OUTPATIENT
Start: 2018-03-15 | End: 2018-04-12 | Stop reason: SDUPTHER

## 2018-03-26 RX ORDER — CLONAZEPAM 2 MG/1
TABLET ORAL
Qty: 90 TABLET | Refills: 2 | OUTPATIENT
Start: 2018-03-26

## 2018-04-11 RX ORDER — PREDNISONE 10 MG/1
TABLET ORAL
COMMUNITY
Start: 2017-02-15 | End: 2018-09-21

## 2018-04-11 RX ORDER — FLUTICASONE PROPIONATE 50 MCG
2 SPRAY, SUSPENSION (ML) NASAL DAILY
COMMUNITY
Start: 2015-04-16 | End: 2019-07-03 | Stop reason: SDUPTHER

## 2018-04-11 RX ORDER — AMMONIUM LACTATE 12 G/100G
LOTION TOPICAL
Refills: 0 | COMMUNITY
Start: 2018-02-26

## 2018-04-11 RX ORDER — MULTIVITAMIN WITH IRON
1 TABLET ORAL 2 TIMES DAILY
Refills: 0 | COMMUNITY
Start: 2018-01-02

## 2018-04-11 RX ORDER — MORPHINE SULFATE 15 MG/1
TABLET, FILM COATED, EXTENDED RELEASE ORAL
COMMUNITY
Start: 2015-04-27 | End: 2018-04-12

## 2018-04-11 RX ORDER — CLONAZEPAM 2 MG/1
1 TABLET ORAL 3 TIMES DAILY
COMMUNITY
Start: 2016-05-20 | End: 2018-07-02 | Stop reason: SDUPTHER

## 2018-04-11 RX ORDER — SIMETHICONE 180 MG
1 CAPSULE ORAL 4 TIMES DAILY PRN
COMMUNITY
Start: 2015-09-17 | End: 2018-04-12 | Stop reason: SDUPTHER

## 2018-04-11 RX ORDER — METOLAZONE 5 MG/1
1 TABLET ORAL EVERY OTHER DAY
COMMUNITY
Start: 2015-09-17 | End: 2018-04-12 | Stop reason: SDUPTHER

## 2018-04-11 RX ORDER — FUROSEMIDE 80 MG
1 TABLET ORAL DAILY
COMMUNITY
Start: 2018-01-12 | End: 2018-07-02 | Stop reason: SDUPTHER

## 2018-04-11 RX ORDER — SUMATRIPTAN 100 MG/1
TABLET, FILM COATED ORAL
Refills: 0 | COMMUNITY
Start: 2018-01-12 | End: 2018-09-21

## 2018-04-11 RX ORDER — TRIAMCINOLONE ACETONIDE 1 MG/G
CREAM TOPICAL 2 TIMES DAILY
COMMUNITY
Start: 2015-03-30

## 2018-04-11 RX ORDER — POTASSIUM CHLORIDE 20 MEQ/1
1 TABLET, EXTENDED RELEASE ORAL 3 TIMES DAILY
COMMUNITY
Start: 2015-09-10 | End: 2018-09-21

## 2018-04-11 RX ORDER — SERTRALINE HYDROCHLORIDE 100 MG/1
1 TABLET, FILM COATED ORAL DAILY
COMMUNITY
Start: 2016-08-19 | End: 2018-05-23 | Stop reason: SDUPTHER

## 2018-04-11 RX ORDER — LACTOSE-REDUCED FOOD 0.06G-1/ML
LIQUID (ML) ORAL
COMMUNITY
Start: 2016-04-12

## 2018-04-11 RX ORDER — FEXOFENADINE HCL 180 MG/1
1 TABLET ORAL DAILY PRN
COMMUNITY
Start: 2017-07-06

## 2018-04-11 RX ORDER — METOPROLOL SUCCINATE 100 MG/1
0.5 TABLET, EXTENDED RELEASE ORAL DAILY
COMMUNITY
Start: 2015-03-16 | End: 2018-05-23 | Stop reason: SDUPTHER

## 2018-04-11 RX ORDER — DOCUSATE SODIUM 250 MG
1 CAPSULE ORAL 2 TIMES DAILY
COMMUNITY
Start: 2015-03-30

## 2018-04-11 RX ORDER — FUROSEMIDE 40 MG/1
2 TABLET ORAL DAILY
COMMUNITY
Start: 2015-03-16 | End: 2018-04-12 | Stop reason: SDUPTHER

## 2018-04-11 RX ORDER — ALBUTEROL SULFATE 90 UG/1
1-2 AEROSOL, METERED RESPIRATORY (INHALATION)
COMMUNITY
Start: 2015-03-16

## 2018-04-11 RX ORDER — CROMOLYN SODIUM 40 MG/ML
1 SOLUTION/ DROPS OPHTHALMIC 4 TIMES DAILY
COMMUNITY
Start: 2015-04-30 | End: 2018-09-21

## 2018-04-11 RX ORDER — SENNA PLUS 8.6 MG/1
TABLET ORAL
COMMUNITY
Start: 2015-03-18 | End: 2018-09-21 | Stop reason: SDUPTHER

## 2018-04-11 RX ORDER — CETIRIZINE HYDROCHLORIDE 10 MG/1
1 TABLET ORAL DAILY
COMMUNITY
Start: 2016-04-13 | End: 2018-05-08 | Stop reason: SDUPTHER

## 2018-04-11 RX ORDER — DOCUSATE SODIUM 250 MG/1
CAPSULE, LIQUID FILLED ORAL
Refills: 0 | COMMUNITY
Start: 2018-01-22

## 2018-04-11 RX ORDER — PIMOZIDE 2 MG/1
2 TABLET ORAL 3 TIMES DAILY
Refills: 0 | COMMUNITY
Start: 2018-03-08 | End: 2018-05-30 | Stop reason: SDUPTHER

## 2018-04-11 RX ORDER — IBUPROFEN 800 MG/1
1 TABLET ORAL 4 TIMES DAILY PRN
COMMUNITY
Start: 2015-11-10 | End: 2018-07-03 | Stop reason: SDUPTHER

## 2018-04-12 ENCOUNTER — OFFICE VISIT (OUTPATIENT)
Dept: FAMILY MEDICINE CLINIC | Facility: CLINIC | Age: 63
End: 2018-04-12
Payer: COMMERCIAL

## 2018-04-12 VITALS
RESPIRATION RATE: 16 BRPM | HEART RATE: 70 BPM | DIASTOLIC BLOOD PRESSURE: 80 MMHG | HEIGHT: 66 IN | WEIGHT: 140.4 LBS | BODY MASS INDEX: 22.56 KG/M2 | OXYGEN SATURATION: 95 % | SYSTOLIC BLOOD PRESSURE: 134 MMHG

## 2018-04-12 DIAGNOSIS — G89.4 CHRONIC PAIN SYNDROME: ICD-10-CM

## 2018-04-12 DIAGNOSIS — M79.89 SWOLLEN ARM: Primary | ICD-10-CM

## 2018-04-12 DIAGNOSIS — R52 PAIN: ICD-10-CM

## 2018-04-12 DIAGNOSIS — R14.3 FLATULENCE: Primary | ICD-10-CM

## 2018-04-12 PROCEDURE — 99214 OFFICE O/P EST MOD 30 MIN: CPT | Performed by: FAMILY MEDICINE

## 2018-04-12 RX ORDER — OXYCODONE HYDROCHLORIDE 20 MG/1
20 TABLET ORAL EVERY 4 HOURS PRN
Qty: 20 TABLET | Refills: 0 | Status: SHIPPED | OUTPATIENT
Start: 2018-04-12 | End: 2018-06-08

## 2018-04-12 RX ORDER — OXYCODONE HYDROCHLORIDE 30 MG/1
30 TABLET ORAL EVERY 4 HOURS PRN
Qty: 180 TABLET | Refills: 0 | Status: SHIPPED | OUTPATIENT
Start: 2018-04-12 | End: 2018-04-13 | Stop reason: SDUPTHER

## 2018-04-12 RX ORDER — SIMETHICONE 180 MG
180 CAPSULE ORAL 4 TIMES DAILY PRN
Qty: 120 CAPSULE | Refills: 5 | Status: SHIPPED | OUTPATIENT
Start: 2018-04-12 | End: 2018-09-21 | Stop reason: SDUPTHER

## 2018-04-12 NOTE — PROGRESS NOTES
Assessment/Plan:    No problem-specific Assessment & Plan notes found for this encounter  Diagnoses and all orders for this visit:    Swollen arm  -     VAS upper limb venous duplex scan, unilateral/limited; Future    Pain  -     oxyCODONE (ROXICODONE) 30 MG immediate release tablet; Take 1 tablet (30 mg total) by mouth every 4 (four) hours as needed for moderate pain or severe pain Max Daily Amount: 180 mg    Chronic pain syndrome  -     oxyCODONE (ROXICODONE) 20 MG TABS; Take 1 tablet (20 mg total) by mouth every 4 (four) hours as needed for moderate pain Max Daily Amount: 120 mg    Other orders  -     ammonium lactate (LAC-HYDRIN) 12 % lotion; APPLY TO KNUCKLES ON RIGHT HAND TWICE A DAY  -     furosemide (LASIX) 80 mg tablet; Take 1 tablet by mouth daily  -     Discontinue: furosemide (LASIX) 40 mg tablet; Take 2 tablets by mouth daily  -     fluticasone (FLONASE) 50 mcg/act nasal spray; 2 sprays into each nostril daily  -     fexofenadine (ALLEGRA) 180 MG tablet; Take 1 tablet by mouth daily as needed  -     RA COL-RITE 250 MG capsule;   -     cromolyn (OPTICROM) 4 % ophthalmic solution; Apply 1 drop to eye 4 times daily  -     Nutritional Supplements (BOOST HIGH PROTEIN) LIQD; Take by mouth  -     albuterol (VENTOLIN HFA) 90 mcg/act inhaler; Inhale 1-2 puffs  -     triamcinolone (KENALOG) 0 1 % cream; Apply topically Twice daily  -     SUMAtriptan (IMITREX) 100 mg tablet; take as directed for headache  -     simethicone (MYLICON,GAS-X) 200 MG capsule; Take 1 capsule by mouth 4 (four) times a day as needed  -     sertraline (ZOLOFT) 100 mg tablet; Take 1 tablet by mouth daily  -     senna (SENNA-LAX) 8 6 MG tablet; Take by mouth  -     predniSONE 10 mg tablet; Take by mouth  -     potassium chloride (K-DUR,KLOR-CON) 20 mEq tablet; Take 1 tablet by mouth 3 (three) times a day  -     pimozide (ORAP) 2 mg tablet;  Take 2 mg by mouth 3 (three) times a day    -     nystatin (MYCOSTATIN) 100,000 units/mL suspension; swish and swallow 1 teaspoonful by mouth three times a day  -     naloxegol oxalate (MOVANTIK) 25 MG tablet; Take by mouth  -     metoprolol succinate (TOPROL-XL) 100 mg 24 hr tablet; Take 0 5 tablets by mouth daily  -     ibuprofen (MOTRIN) 800 mg tablet; Take 1 tablet by mouth 4 (four) times a day as needed  -     Magnesium 250 MG TABS; 1 tablet 2 (two) times a day  -     Discontinue: morphine (MS CONTIN) 15 mg 12 hr tablet; Take by mouth  -     cetirizine (ZyrTEC) 10 mg tablet; Take 1 tablet by mouth daily  -     clonazePAM (KlonoPIN) 2 mg tablet; Take 1 tablet by mouth 3 (three) times a day  -     docusate sodium (COLACE) 250 MG capsule; Take 1 capsule by mouth 2 (two) times a day  -     Discontinue: metolazone (ZAROXOLYN) 5 mg tablet; Take 1 tablet by mouth every other day  -     Hm Mammography          Subjective:      Patient ID: Martin Hurtado is a 66 y o  female  She is a very difficult historian  She reached for a  in a store 10 days ago  She did not hear or feel a pop  She has swelling and pain since  She has decreased ROM  She had films that show destructive arthritis and absence of the humeral head  It is swollen and red  The following portions of the patient's history were reviewed and updated as appropriate:   She  has a past medical history of Malignant melanoma of skin ([facility] [address] )  She   Patient Active Problem List    Diagnosis Date Noted    Swollen arm 04/12/2018    Pain 04/12/2018    Chronic pain syndrome 04/12/2018     She  has a past surgical history that includes Arthrodesis; Total hip arthroplasty (Right); Rhinoplasty; Tonsillectomy and adenoidectomy; and Total abdominal hysterectomy  Her family history includes Heart disease in her mother; Leukemia in her father and mother  She  reports that she has been smoking  She has never used smokeless tobacco  She reports that she does not drink alcohol or use drugs    Current Outpatient Prescriptions   Medication Sig Dispense Refill    albuterol (VENTOLIN HFA) 90 mcg/act inhaler Inhale 1-2 puffs      ammonium lactate (LAC-HYDRIN) 12 % lotion APPLY TO KNUCKLES ON RIGHT HAND TWICE A DAY  0    clonazePAM (KlonoPIN) 2 mg tablet Take 1 tablet by mouth 3 (three) times a day      diazepam (VALIUM) 5 mg tablet take 1 tablet by mouth four times a day if needed 120 tablet 2    docusate sodium (COLACE) 250 MG capsule Take 1 capsule by mouth 2 (two) times a day      fexofenadine (ALLEGRA) 180 MG tablet Take 1 tablet by mouth daily as needed      fluticasone (FLONASE) 50 mcg/act nasal spray 2 sprays into each nostril daily      furosemide (LASIX) 80 mg tablet Take 1 tablet by mouth daily      ibuprofen (MOTRIN) 800 mg tablet Take 1 tablet by mouth 4 (four) times a day as needed      Magnesium 250 MG TABS 1 tablet 2 (two) times a day  0    metolazone (ZAROXOLYN) 5 mg tablet TAKE 1 TABLET BY MOUTH EVERY OTHER DAY 15 tablet 5    metoprolol succinate (TOPROL-XL) 100 mg 24 hr tablet Take 0 5 tablets by mouth daily      Nutritional Supplements (BOOST HIGH PROTEIN) LIQD Take by mouth      oxyCODONE (ROXICODONE) 30 MG immediate release tablet Take 1 tablet (30 mg total) by mouth every 4 (four) hours as needed for moderate pain or severe pain Max Daily Amount: 180 mg 180 tablet 0    pantoprazole (PROTONIX) 40 mg tablet take 1 tablet by mouth once daily 30 tablet 4    pimozide (ORAP) 2 mg tablet Take 2 mg by mouth 3 (three) times a day    0    potassium chloride (K-DUR,KLOR-CON) 20 mEq tablet Take 1 tablet by mouth 3 (three) times a day      PREMARIN 1 25 MG tablet take 1 tablet by mouth once daily 30 tablet 5    RA COL-RITE 250 MG capsule   0    senna (SENNA-LAX) 8 6 MG tablet Take by mouth      sertraline (ZOLOFT) 100 mg tablet Take 1 tablet by mouth daily      simethicone (MYLICON,GAS-X) 283 MG capsule Take 1 capsule by mouth 4 (four) times a day as needed      triamcinolone (KENALOG) 0 1 % cream Apply topically Twice daily      azithromycin (ZITHROMAX) 250 mg tablet Take by mouth      cetirizine (ZyrTEC) 10 mg tablet Take 1 tablet by mouth daily      cromolyn (OPTICROM) 4 % ophthalmic solution Apply 1 drop to eye 4 times daily      naloxegol oxalate (MOVANTIK) 25 MG tablet Take by mouth      nystatin (MYCOSTATIN) 100,000 units/mL suspension swish and swallow 1 teaspoonful by mouth three times a day  0    oxyCODONE (ROXICODONE) 20 MG TABS Take 1 tablet (20 mg total) by mouth every 4 (four) hours as needed for moderate pain Max Daily Amount: 120 mg 20 tablet 0    predniSONE 10 mg tablet Take by mouth      SUMAtriptan (IMITREX) 100 mg tablet take as directed for headache  0     No current facility-administered medications for this visit  Current Outpatient Prescriptions on File Prior to Visit   Medication Sig    diazepam (VALIUM) 5 mg tablet take 1 tablet by mouth four times a day if needed    metolazone (ZAROXOLYN) 5 mg tablet TAKE 1 TABLET BY MOUTH EVERY OTHER DAY    pantoprazole (PROTONIX) 40 mg tablet take 1 tablet by mouth once daily    PREMARIN 1 25 MG tablet take 1 tablet by mouth once daily    [DISCONTINUED] oxyCODONE (ROXICODONE) 30 MG immediate release tablet Take 1 tablet (30 mg total) by mouth every 4 (four) hours as needed for moderate pain or severe pain Max Daily Amount: 180 mg    azithromycin (ZITHROMAX) 250 mg tablet Take by mouth    [DISCONTINUED] pantoprazole (PROTONIX) 40 mg tablet take 1 tablet by mouth once daily     No current facility-administered medications on file prior to visit  She is allergic to cephalexin; ciprofloxacin; eggs or egg-derived products; other; and penicillins       Review of Systems   Musculoskeletal: Positive for arthralgias and joint swelling  All other systems reviewed and are negative          Objective:      /80 (BP Location: Right arm, Patient Position: Sitting, Cuff Size: Standard)   Pulse 70   Resp 16   Ht 5' 6" (1 676 m)   Wt 63 7 kg (140 lb 6 4 oz)   SpO2 95%   BMI 22 66 kg/m²          Physical Exam   Constitutional: She appears well-developed and well-nourished  Neck: Normal range of motion  Neck supple  Cardiovascular: Normal rate, regular rhythm, normal heart sounds and intact distal pulses  Pulmonary/Chest: Effort normal    Abdominal: Soft  Bowel sounds are normal    Musculoskeletal:   Swollen, red, tender right upper ext  Nursing note and vitals reviewed

## 2018-04-13 ENCOUNTER — TELEPHONE (OUTPATIENT)
Dept: FAMILY MEDICINE CLINIC | Facility: CLINIC | Age: 63
End: 2018-04-13

## 2018-04-13 DIAGNOSIS — R52 PAIN: ICD-10-CM

## 2018-04-13 RX ORDER — OXYCODONE HYDROCHLORIDE 30 MG/1
30 TABLET ORAL EVERY 4 HOURS PRN
Qty: 180 TABLET | Refills: 0 | Status: SHIPPED | OUTPATIENT
Start: 2018-04-13 | End: 2018-05-08 | Stop reason: SDUPTHER

## 2018-05-04 ENCOUNTER — TELEPHONE (OUTPATIENT)
Dept: FAMILY MEDICINE CLINIC | Facility: CLINIC | Age: 63
End: 2018-05-04

## 2018-05-04 DIAGNOSIS — M62.838 MUSCLE SPASM: ICD-10-CM

## 2018-05-04 RX ORDER — DIAZEPAM 5 MG/1
5 TABLET ORAL 4 TIMES DAILY PRN
Qty: 120 TABLET | Refills: 5 | Status: SHIPPED | OUTPATIENT
Start: 2018-05-04 | End: 2018-08-24 | Stop reason: SDUPTHER

## 2018-05-07 ENCOUNTER — TELEPHONE (OUTPATIENT)
Dept: FAMILY MEDICINE CLINIC | Facility: CLINIC | Age: 63
End: 2018-05-07

## 2018-05-07 DIAGNOSIS — R52 PAIN: ICD-10-CM

## 2018-05-07 DIAGNOSIS — J30.9 ALLERGIC RHINITIS, UNSPECIFIED SEASONALITY, UNSPECIFIED TRIGGER: Primary | ICD-10-CM

## 2018-05-08 RX ORDER — CETIRIZINE HYDROCHLORIDE 10 MG/1
10 TABLET ORAL DAILY
Qty: 30 TABLET | Refills: 5 | Status: SHIPPED | OUTPATIENT
Start: 2018-05-08 | End: 2019-05-10 | Stop reason: SDUPTHER

## 2018-05-08 RX ORDER — OXYCODONE HYDROCHLORIDE 30 MG/1
30 TABLET ORAL EVERY 4 HOURS PRN
Qty: 180 TABLET | Refills: 0 | Status: SHIPPED | OUTPATIENT
Start: 2018-05-08 | End: 2018-06-05 | Stop reason: SDUPTHER

## 2018-05-08 NOTE — ADDENDUM NOTE
Addended by: [facility] [place] on: 5/8/2018 02:31 PM     Modules accepted: Orders

## 2018-05-23 DIAGNOSIS — I10 ESSENTIAL HYPERTENSION: ICD-10-CM

## 2018-05-23 DIAGNOSIS — F32.A DEPRESSION, UNSPECIFIED DEPRESSION TYPE: Primary | ICD-10-CM

## 2018-05-24 RX ORDER — METOPROLOL SUCCINATE 100 MG/1
TABLET, EXTENDED RELEASE ORAL
Qty: 30 TABLET | Refills: 5 | Status: SHIPPED | OUTPATIENT
Start: 2018-05-24 | End: 2019-05-23 | Stop reason: SDUPTHER

## 2018-05-24 RX ORDER — SERTRALINE HYDROCHLORIDE 100 MG/1
TABLET, FILM COATED ORAL
Qty: 30 TABLET | Refills: 5 | Status: SHIPPED | OUTPATIENT
Start: 2018-05-24 | End: 2018-11-05 | Stop reason: SDUPTHER

## 2018-05-30 DIAGNOSIS — F95.2 TOURETTE'S: Primary | ICD-10-CM

## 2018-05-31 RX ORDER — PIMOZIDE 2 MG/1
TABLET ORAL
Qty: 90 TABLET | Refills: 5 | Status: SHIPPED | OUTPATIENT
Start: 2018-05-31 | End: 2018-11-23 | Stop reason: SDUPTHER

## 2018-06-05 DIAGNOSIS — R52 PAIN: ICD-10-CM

## 2018-06-06 RX ORDER — OXYCODONE HYDROCHLORIDE 30 MG/1
30 TABLET ORAL EVERY 4 HOURS PRN
Qty: 180 TABLET | Refills: 0 | Status: SHIPPED | OUTPATIENT
Start: 2018-06-06 | End: 2018-06-08 | Stop reason: SDUPTHER

## 2018-06-06 RX ORDER — POTASSIUM CHLORIDE 20MEQ/15ML
LIQUID (ML) ORAL
Refills: 0 | COMMUNITY
Start: 2018-05-10 | End: 2018-07-03 | Stop reason: SDUPTHER

## 2018-06-08 DIAGNOSIS — R52 PAIN: ICD-10-CM

## 2018-06-08 RX ORDER — OXYCODONE HYDROCHLORIDE 30 MG/1
30 TABLET ORAL EVERY 4 HOURS PRN
Qty: 180 TABLET | Refills: 0 | Status: SHIPPED | OUTPATIENT
Start: 2018-06-08 | End: 2018-06-27 | Stop reason: SDUPTHER

## 2018-06-27 DIAGNOSIS — R52 PAIN: ICD-10-CM

## 2018-06-27 RX ORDER — OXYCODONE HYDROCHLORIDE 30 MG/1
30 TABLET ORAL EVERY 4 HOURS PRN
Qty: 180 TABLET | Refills: 0 | Status: SHIPPED | OUTPATIENT
Start: 2018-06-27 | End: 2018-08-02 | Stop reason: SDUPTHER

## 2018-07-01 DIAGNOSIS — R60.9 EDEMA, UNSPECIFIED TYPE: Primary | ICD-10-CM

## 2018-07-02 DIAGNOSIS — R52 PAIN: ICD-10-CM

## 2018-07-02 DIAGNOSIS — R60.9 EDEMA, UNSPECIFIED TYPE: Primary | ICD-10-CM

## 2018-07-02 DIAGNOSIS — F41.9 ANXIETY: ICD-10-CM

## 2018-07-02 RX ORDER — CLONAZEPAM 2 MG/1
2 TABLET ORAL 3 TIMES DAILY
Qty: 90 TABLET | Refills: 2 | Status: SHIPPED | OUTPATIENT
Start: 2018-07-02 | End: 2018-12-01 | Stop reason: SDUPTHER

## 2018-07-02 RX ORDER — FUROSEMIDE 80 MG
80 TABLET ORAL DAILY
Qty: 30 TABLET | Refills: 5 | Status: SHIPPED | OUTPATIENT
Start: 2018-07-02

## 2018-07-02 RX ORDER — OXYCODONE HYDROCHLORIDE 30 MG/1
30 TABLET ORAL EVERY 4 HOURS PRN
Qty: 180 TABLET | Refills: 0 | Status: CANCELLED | OUTPATIENT
Start: 2018-07-02

## 2018-07-02 NOTE — TELEPHONE ENCOUNTER
Will notify patient I escribed her Lasix to [facility] but I can not escribed her Oxycodone due to it was ordered on 6/27/18 and she should have a printed script to take to the pharmacy

## 2018-07-03 DIAGNOSIS — R52 PAIN: Primary | ICD-10-CM

## 2018-07-04 RX ORDER — IBUPROFEN 800 MG/1
TABLET ORAL
Qty: 100 TABLET | Refills: 5 | Status: SHIPPED | OUTPATIENT
Start: 2018-07-04 | End: 2018-07-09 | Stop reason: SDUPTHER

## 2018-07-04 RX ORDER — FUROSEMIDE 80 MG
TABLET ORAL
Qty: 30 TABLET | Refills: 5 | Status: SHIPPED | OUTPATIENT
Start: 2018-07-04 | End: 2018-09-21

## 2018-07-04 RX ORDER — POTASSIUM CHLORIDE 20MEQ/15ML
LIQUID (ML) ORAL
Qty: 240 ML | Refills: 3 | Status: SHIPPED | OUTPATIENT
Start: 2018-07-04 | End: 2018-07-09 | Stop reason: SDUPTHER

## 2018-07-09 DIAGNOSIS — R52 PAIN: ICD-10-CM

## 2018-07-09 RX ORDER — IBUPROFEN 800 MG/1
800 TABLET ORAL EVERY 6 HOURS PRN
Qty: 100 TABLET | Refills: 5 | Status: SHIPPED | OUTPATIENT
Start: 2018-07-09

## 2018-07-09 RX ORDER — POTASSIUM CHLORIDE 20MEQ/15ML
20 LIQUID (ML) ORAL DAILY
Qty: 240 ML | Refills: 0 | Status: SHIPPED | OUTPATIENT
Start: 2018-07-09 | End: 2019-08-02

## 2018-08-02 DIAGNOSIS — R52 PAIN: ICD-10-CM

## 2018-08-03 DIAGNOSIS — R52 PAIN: ICD-10-CM

## 2018-08-03 RX ORDER — OXYCODONE HYDROCHLORIDE 30 MG/1
30 TABLET ORAL EVERY 4 HOURS PRN
Qty: 180 TABLET | Refills: 0 | Status: SHIPPED | OUTPATIENT
Start: 2018-08-03 | End: 2018-08-03 | Stop reason: SDUPTHER

## 2018-08-03 RX ORDER — OXYCODONE HYDROCHLORIDE 30 MG/1
30 TABLET ORAL EVERY 4 HOURS PRN
Qty: 180 TABLET | Refills: 0 | Status: SHIPPED | OUTPATIENT
Start: 2018-08-03 | End: 2018-08-24 | Stop reason: SDUPTHER

## 2018-08-24 DIAGNOSIS — R52 PAIN: ICD-10-CM

## 2018-08-24 DIAGNOSIS — M62.838 MUSCLE SPASM: ICD-10-CM

## 2018-08-24 RX ORDER — DIAZEPAM 5 MG/1
5 TABLET ORAL 4 TIMES DAILY PRN
Qty: 120 TABLET | Refills: 0 | Status: SHIPPED | OUTPATIENT
Start: 2018-08-24 | End: 2018-09-21

## 2018-08-24 RX ORDER — OXYCODONE HYDROCHLORIDE 30 MG/1
30 TABLET ORAL EVERY 4 HOURS PRN
Qty: 180 TABLET | Refills: 0 | Status: SHIPPED | OUTPATIENT
Start: 2018-08-24 | End: 2018-08-24 | Stop reason: SDUPTHER

## 2018-08-24 RX ORDER — OXYCODONE HYDROCHLORIDE 30 MG/1
30 TABLET ORAL EVERY 4 HOURS PRN
Qty: 180 TABLET | Refills: 0 | Status: SHIPPED | OUTPATIENT
Start: 2018-08-24 | End: 2018-09-26 | Stop reason: SDUPTHER

## 2018-08-30 DIAGNOSIS — R60.9 EDEMA, UNSPECIFIED TYPE: ICD-10-CM

## 2018-09-21 ENCOUNTER — OFFICE VISIT (OUTPATIENT)
Dept: FAMILY MEDICINE CLINIC | Facility: CLINIC | Age: 63
End: 2018-09-21
Payer: COMMERCIAL

## 2018-09-21 VITALS
HEART RATE: 70 BPM | DIASTOLIC BLOOD PRESSURE: 68 MMHG | OXYGEN SATURATION: 90 % | RESPIRATION RATE: 15 BRPM | WEIGHT: 139.4 LBS | BODY MASS INDEX: 22.4 KG/M2 | SYSTOLIC BLOOD PRESSURE: 108 MMHG | HEIGHT: 66 IN

## 2018-09-21 DIAGNOSIS — G89.4 CHRONIC PAIN SYNDROME: ICD-10-CM

## 2018-09-21 DIAGNOSIS — C34.90 MALIGNANT NEOPLASM OF LUNG, UNSPECIFIED LATERALITY, UNSPECIFIED PART OF LUNG (HCC): ICD-10-CM

## 2018-09-21 DIAGNOSIS — J42 CHRONIC BRONCHITIS, UNSPECIFIED CHRONIC BRONCHITIS TYPE (HCC): Primary | ICD-10-CM

## 2018-09-21 DIAGNOSIS — R14.3 FLATULENCE: ICD-10-CM

## 2018-09-21 DIAGNOSIS — I10 ESSENTIAL HYPERTENSION: ICD-10-CM

## 2018-09-21 PROBLEM — J44.9 COPD (CHRONIC OBSTRUCTIVE PULMONARY DISEASE) (HCC): Status: ACTIVE | Noted: 2017-02-15

## 2018-09-21 PROCEDURE — 99214 OFFICE O/P EST MOD 30 MIN: CPT | Performed by: FAMILY MEDICINE

## 2018-09-21 PROCEDURE — 3074F SYST BP LT 130 MM HG: CPT | Performed by: FAMILY MEDICINE

## 2018-09-21 PROCEDURE — 3078F DIAST BP <80 MM HG: CPT | Performed by: FAMILY MEDICINE

## 2018-09-21 PROCEDURE — 3008F BODY MASS INDEX DOCD: CPT | Performed by: FAMILY MEDICINE

## 2018-09-21 RX ORDER — SIMETHICONE 180 MG
180 CAPSULE ORAL 4 TIMES DAILY PRN
Qty: 120 CAPSULE | Refills: 0 | Status: SHIPPED | OUTPATIENT
Start: 2018-09-21

## 2018-09-21 RX ORDER — AZITHROMYCIN 250 MG/1
TABLET, FILM COATED ORAL
Qty: 6 TABLET | Refills: 0 | Status: SHIPPED | OUTPATIENT
Start: 2018-09-21 | End: 2018-09-25

## 2018-09-21 RX ORDER — SENNA PLUS 8.6 MG/1
1 TABLET ORAL 2 TIMES DAILY
Qty: 60 TABLET | Refills: 5 | Status: SHIPPED | OUTPATIENT
Start: 2018-09-21

## 2018-09-21 NOTE — PROGRESS NOTES
Assessment/Plan:    No problem-specific Assessment & Plan notes found for this encounter  Diagnoses and all orders for this visit:    Chronic bronchitis, unspecified chronic bronchitis type ([facility] )    Chronic pain syndrome    Malignant neoplasm of lung, unspecified laterality, unspecified part of lung ([facility] )    Essential hypertension    Other orders  -     TURMERIC PO; Take by mouth        COPD is stable  Lung CA:  No evidence of recurrence  HTN:  Stable  Cont current  Chronic pain:  Fairly well controlled  Cont current  Subjective:      Patient ID: Fabiola Webster is a 66 y o  female  Another person stopped in to the office and claimed that Stephan Meraz was selling her medication  We sent her to the pharmacy for a pill count  Her pill count was accurate  She has charcot joints and numerous surgeries on her c-spine  She has chronic pain  She is taking oxycodone with fair relief  She has no side effects  She is s/p chemo and radiation for lung CA  She had surgical resection  His BP is at goal today  She has no CP or SOB  She has no HA or vision changes  The following portions of the patient's history were reviewed and updated as appropriate:   She  has a past medical history of Malignant melanoma of skin ([facility] )  She   Patient Active Problem List    Diagnosis Date Noted    Swollen arm 04/12/2018    Pain 04/12/2018    Chronic pain syndrome 04/12/2018    COPD (chronic obstructive pulmonary disease) ([facility] ) 02/15/2017    Lung cancer ([place] [address] ) 11/18/2016    Acute exacerbation of chronic obstructive airways disease with asthma ([place] [address] ) 03/16/2015    Hypertension 03/16/2015     She  has a past surgical history that includes Arthrodesis; Total hip arthroplasty (Right); Rhinoplasty; Tonsillectomy and adenoidectomy; and Total abdominal hysterectomy  Her family history includes Heart disease in her mother; Leukemia in her father and mother    She  reports that she has been smoking  She has never used smokeless tobacco  She reports that she does not drink alcohol or use drugs    Current Outpatient Prescriptions   Medication Sig Dispense Refill    albuterol (VENTOLIN HFA) 90 mcg/act inhaler Inhale 1-2 puffs      ammonium lactate (LAC-HYDRIN) 12 % lotion APPLY TO KNUCKLES ON RIGHT HAND TWICE A DAY  0    cetirizine (ZyrTEC) 10 mg tablet Take 1 tablet (10 mg total) by mouth daily 30 tablet 5    clonazePAM (KlonoPIN) 2 mg tablet Take 1 tablet (2 mg total) by mouth 3 (three) times a day 90 tablet 2    conjugated estrogens (PREMARIN) 1 25 mg tablet Take 1 tablet (1 25 mg total) by mouth daily 30 tablet 5    docusate sodium (COLACE) 250 MG capsule Take 1 capsule by mouth 2 (two) times a day      fluocinonide (LIDEX) 0 05 % cream   0    fluticasone (FLONASE) 50 mcg/act nasal spray 2 sprays into each nostril daily      furosemide (LASIX) 80 mg tablet Take 1 tablet (80 mg total) by mouth daily 30 tablet 5    ibuprofen (MOTRIN) 800 mg tablet Take 1 tablet (800 mg total) by mouth every 6 (six) hours as needed (prn) 100 tablet 5    Magnesium 250 MG TABS 1 tablet 2 (two) times a day  0    metolazone (ZAROXOLYN) 5 mg tablet TAKE 1 TABLET BY MOUTH EVERY OTHER DAY 15 tablet 5    metoprolol succinate (TOPROL-XL) 100 mg 24 hr tablet take 1/2 tablet daily 30 tablet 5    Nutritional Supplements (BOOST HIGH PROTEIN) LIQD Take by mouth      oxyCODONE (ROXICODONE) 30 MG immediate release tablet Take 1 tablet (30 mg total) by mouth every 4 (four) hours as needed for moderate pain or severe pain Max Daily Amount: 180 mg 180 tablet 0    pantoprazole (PROTONIX) 40 mg tablet take 1 tablet by mouth once daily 30 tablet 4    pimozide (ORAP) 2 mg tablet take 1 tablet by mouth three times a day as directed 90 tablet 5    potassium chloride 10 % Take 15 mL (20 mEq total) by mouth daily 15ml po daily 240 mL 0    RA COL-RITE 250 MG capsule   0    senna (SENNA-LAX) 8 6 MG tablet Take by mouth      sertraline (ZOLOFT) 100 mg tablet take 1 tablet by mouth once daily 30 tablet 5    simethicone (MYLICON,GAS-X) 414 MG capsule Take 1 capsule (180 mg total) by mouth 4 (four) times a day as needed for flatulence 120 capsule 5    triamcinolone (KENALOG) 0 1 % cream Apply topically Twice daily      TURMERIC PO Take by mouth      fexofenadine (ALLEGRA) 180 MG tablet Take 1 tablet by mouth daily as needed      naloxegol oxalate (MOVANTIK) 25 MG tablet Take by mouth      nystatin (MYCOSTATIN) 100,000 units/mL suspension swish and swallow 1 teaspoonful by mouth three times a day  0     No current facility-administered medications for this visit        Current Outpatient Prescriptions on File Prior to Visit   Medication Sig    albuterol (VENTOLIN HFA) 90 mcg/act inhaler Inhale 1-2 puffs    ammonium lactate (LAC-HYDRIN) 12 % lotion APPLY TO KNUCKLES ON RIGHT HAND TWICE A DAY    cetirizine (ZyrTEC) 10 mg tablet Take 1 tablet (10 mg total) by mouth daily    clonazePAM (KlonoPIN) 2 mg tablet Take 1 tablet (2 mg total) by mouth 3 (three) times a day    conjugated estrogens (PREMARIN) 1 25 mg tablet Take 1 tablet (1 25 mg total) by mouth daily    docusate sodium (COLACE) 250 MG capsule Take 1 capsule by mouth 2 (two) times a day    fluocinonide (LIDEX) 0 05 % cream     fluticasone (FLONASE) 50 mcg/act nasal spray 2 sprays into each nostril daily    furosemide (LASIX) 80 mg tablet Take 1 tablet (80 mg total) by mouth daily    ibuprofen (MOTRIN) 800 mg tablet Take 1 tablet (800 mg total) by mouth every 6 (six) hours as needed (prn)    Magnesium 250 MG TABS 1 tablet 2 (two) times a day    metolazone (ZAROXOLYN) 5 mg tablet TAKE 1 TABLET BY MOUTH EVERY OTHER DAY    metoprolol succinate (TOPROL-XL) 100 mg 24 hr tablet take 1/2 tablet daily    Nutritional Supplements (BOOST HIGH PROTEIN) LIQD Take by mouth    oxyCODONE (ROXICODONE) 30 MG immediate release tablet Take 1 tablet (30 mg total) by mouth every 4 (four) hours as needed for moderate pain or severe pain Max Daily Amount: 180 mg    pantoprazole (PROTONIX) 40 mg tablet take 1 tablet by mouth once daily    pimozide (ORAP) 2 mg tablet take 1 tablet by mouth three times a day as directed    potassium chloride 10 % Take 15 mL (20 mEq total) by mouth daily 15ml po daily    RA COL-RITE 250 MG capsule     senna (SENNA-LAX) 8 6 MG tablet Take by mouth    sertraline (ZOLOFT) 100 mg tablet take 1 tablet by mouth once daily    simethicone (MYLICON,GAS-X) 853 MG capsule Take 1 capsule (180 mg total) by mouth 4 (four) times a day as needed for flatulence    triamcinolone (KENALOG) 0 1 % cream Apply topically Twice daily    [DISCONTINUED] diazepam (VALIUM) 5 mg tablet Take 1 tablet (5 mg total) by mouth 4 (four) times a day as needed for anxiety or muscle spasms    fexofenadine (ALLEGRA) 180 MG tablet Take 1 tablet by mouth daily as needed    naloxegol oxalate (MOVANTIK) 25 MG tablet Take by mouth    nystatin (MYCOSTATIN) 100,000 units/mL suspension swish and swallow 1 teaspoonful by mouth three times a day    [DISCONTINUED] azithromycin (ZITHROMAX) 250 mg tablet Take by mouth    [DISCONTINUED] cromolyn (OPTICROM) 4 % ophthalmic solution Apply 1 drop to eye 4 times daily    [DISCONTINUED] furosemide (LASIX) 80 mg tablet take 1 tablet by mouth once daily (Patient not taking: Reported on 9/21/2018)    [DISCONTINUED] potassium chloride (K-DUR,KLOR-CON) 20 mEq tablet Take 1 tablet by mouth 3 (three) times a day    [DISCONTINUED] predniSONE 10 mg tablet Take by mouth    [DISCONTINUED] SUMAtriptan (IMITREX) 100 mg tablet take as directed for headache     No current facility-administered medications on file prior to visit  She is allergic to cephalexin; ciprofloxacin; eggs or egg-derived products; other; and penicillins       Review of Systems   All other systems reviewed and are negative          Objective:      /68 (BP Location: Right arm, Patient Position: Sitting, Cuff Size: Standard)   Pulse 70   Resp 15   Ht 5' 6" (1 676 m)   Wt 63 2 kg (139 lb 6 4 oz)   SpO2 90%   BMI 22 50 kg/m²          Physical Exam   Constitutional: She is oriented to person, place, and time  She appears well-developed and well-nourished  Neck: Normal range of motion  Neck supple  Cardiovascular: Normal rate, regular rhythm, normal heart sounds and intact distal pulses  Pulmonary/Chest: Effort normal and breath sounds normal    Abdominal: Soft  Bowel sounds are normal    Musculoskeletal: Normal range of motion  Neurological: She is alert and oriented to person, place, and time  Skin: Skin is warm and dry  Psychiatric: She has a normal mood and affect  Her behavior is normal  Judgment and thought content normal    Nursing note and vitals reviewed

## 2018-09-26 DIAGNOSIS — R52 PAIN: ICD-10-CM

## 2018-09-26 RX ORDER — OXYCODONE HYDROCHLORIDE 30 MG/1
30 TABLET ORAL EVERY 4 HOURS PRN
Qty: 180 TABLET | Refills: 0 | Status: SHIPPED | OUTPATIENT
Start: 2018-09-26 | End: 2018-09-27 | Stop reason: SDUPTHER

## 2018-09-27 ENCOUNTER — TELEPHONE (OUTPATIENT)
Dept: FAMILY MEDICINE CLINIC | Facility: CLINIC | Age: 63
End: 2018-09-27

## 2018-09-27 DIAGNOSIS — R52 PAIN: ICD-10-CM

## 2018-09-28 RX ORDER — OXYCODONE HYDROCHLORIDE 30 MG/1
30 TABLET ORAL EVERY 4 HOURS PRN
Qty: 180 TABLET | Refills: 0 | Status: SHIPPED | OUTPATIENT
Start: 2018-09-28 | End: 2018-10-22 | Stop reason: SDUPTHER

## 2018-09-28 RX ORDER — OXYCODONE HYDROCHLORIDE 30 MG/1
30 TABLET ORAL EVERY 4 HOURS PRN
Qty: 180 TABLET | Refills: 0 | Status: SHIPPED | OUTPATIENT
Start: 2018-09-28 | End: 2018-09-28 | Stop reason: SDUPTHER

## 2018-10-22 DIAGNOSIS — R52 PAIN: ICD-10-CM

## 2018-10-22 RX ORDER — OXYCODONE HYDROCHLORIDE 30 MG/1
30 TABLET ORAL EVERY 4 HOURS PRN
Qty: 180 TABLET | Refills: 0 | Status: SHIPPED | OUTPATIENT
Start: 2018-10-22 | End: 2018-10-26 | Stop reason: SDUPTHER

## 2018-10-26 DIAGNOSIS — R52 PAIN: ICD-10-CM

## 2018-10-26 RX ORDER — OXYCODONE HYDROCHLORIDE 30 MG/1
30 TABLET ORAL EVERY 4 HOURS PRN
Qty: 18 TABLET | Refills: 0 | Status: SHIPPED | OUTPATIENT
Start: 2018-10-26 | End: 2018-10-27 | Stop reason: SDUPTHER

## 2018-10-27 DIAGNOSIS — R52 PAIN: ICD-10-CM

## 2018-10-27 RX ORDER — OXYCODONE HYDROCHLORIDE 30 MG/1
30 TABLET ORAL EVERY 4 HOURS PRN
Qty: 180 TABLET | Refills: 0 | Status: SHIPPED | OUTPATIENT
Start: 2018-10-27 | End: 2018-11-19 | Stop reason: SDUPTHER

## 2018-10-27 RX ORDER — OXYCODONE HYDROCHLORIDE 30 MG/1
30 TABLET ORAL EVERY 4 HOURS PRN
Qty: 180 TABLET | Refills: 0 | Status: SHIPPED | OUTPATIENT
Start: 2018-10-27 | End: 2018-10-27 | Stop reason: SDUPTHER

## 2018-11-05 DIAGNOSIS — F32.A DEPRESSION, UNSPECIFIED DEPRESSION TYPE: ICD-10-CM

## 2018-11-05 RX ORDER — SERTRALINE HYDROCHLORIDE 100 MG/1
100 TABLET, FILM COATED ORAL DAILY
Qty: 30 TABLET | Refills: 5 | Status: SHIPPED | OUTPATIENT
Start: 2018-11-05 | End: 2019-04-24 | Stop reason: SDUPTHER

## 2018-11-19 DIAGNOSIS — R52 PAIN: ICD-10-CM

## 2018-11-19 RX ORDER — OXYCODONE HYDROCHLORIDE 30 MG/1
30 TABLET ORAL EVERY 4 HOURS PRN
Qty: 180 TABLET | Refills: 0 | Status: SHIPPED | OUTPATIENT
Start: 2018-11-19 | End: 2018-11-21 | Stop reason: SDUPTHER

## 2018-11-21 DIAGNOSIS — R52 PAIN: ICD-10-CM

## 2018-11-23 DIAGNOSIS — F95.2 TOURETTE'S: ICD-10-CM

## 2018-11-23 RX ORDER — OXYCODONE HYDROCHLORIDE 30 MG/1
30 TABLET ORAL EVERY 4 HOURS PRN
Qty: 180 TABLET | Refills: 0 | Status: SHIPPED | OUTPATIENT
Start: 2018-11-23 | End: 2018-11-23 | Stop reason: SDUPTHER

## 2018-11-23 RX ORDER — OXYCODONE HYDROCHLORIDE 30 MG/1
30 TABLET ORAL EVERY 4 HOURS PRN
Qty: 180 TABLET | Refills: 0 | Status: SHIPPED | OUTPATIENT
Start: 2018-11-23 | End: 2018-12-20 | Stop reason: SDUPTHER

## 2018-11-26 RX ORDER — PIMOZIDE 2 MG/1
2 TABLET ORAL 3 TIMES DAILY
Qty: 90 TABLET | Refills: 5 | Status: SHIPPED | OUTPATIENT
Start: 2018-11-26 | End: 2019-05-10 | Stop reason: SDUPTHER

## 2018-12-01 DIAGNOSIS — F41.9 ANXIETY: ICD-10-CM

## 2018-12-03 RX ORDER — CLONAZEPAM 2 MG/1
TABLET ORAL
Qty: 90 TABLET | Refills: 2 | Status: SHIPPED | OUTPATIENT
Start: 2018-12-03 | End: 2018-12-20 | Stop reason: SDUPTHER

## 2018-12-19 RX ORDER — LEVOFLOXACIN 500 MG/1
TABLET, FILM COATED ORAL
Refills: 0 | COMMUNITY
Start: 2018-10-25

## 2018-12-19 RX ORDER — MECLIZINE HCL 12.5 MG/1
12.5 TABLET ORAL EVERY 8 HOURS PRN
Refills: 0 | COMMUNITY
Start: 2018-10-08

## 2018-12-20 ENCOUNTER — OFFICE VISIT (OUTPATIENT)
Dept: FAMILY MEDICINE CLINIC | Facility: CLINIC | Age: 63
End: 2018-12-20
Payer: COMMERCIAL

## 2018-12-20 VITALS
DIASTOLIC BLOOD PRESSURE: 78 MMHG | SYSTOLIC BLOOD PRESSURE: 114 MMHG | OXYGEN SATURATION: 91 % | WEIGHT: 139.4 LBS | BODY MASS INDEX: 22.4 KG/M2 | HEART RATE: 67 BPM | HEIGHT: 66 IN | RESPIRATION RATE: 16 BRPM

## 2018-12-20 DIAGNOSIS — Z13.6 SCREENING FOR ISCHEMIC HEART DISEASE: ICD-10-CM

## 2018-12-20 DIAGNOSIS — I10 ESSENTIAL HYPERTENSION: ICD-10-CM

## 2018-12-20 DIAGNOSIS — F41.9 ANXIETY: ICD-10-CM

## 2018-12-20 DIAGNOSIS — G89.4 CHRONIC PAIN SYNDROME: ICD-10-CM

## 2018-12-20 DIAGNOSIS — C34.90 MALIGNANT NEOPLASM OF LUNG, UNSPECIFIED LATERALITY, UNSPECIFIED PART OF LUNG (HCC): ICD-10-CM

## 2018-12-20 DIAGNOSIS — R52 PAIN: ICD-10-CM

## 2018-12-20 DIAGNOSIS — J42 CHRONIC BRONCHITIS, UNSPECIFIED CHRONIC BRONCHITIS TYPE (HCC): Primary | ICD-10-CM

## 2018-12-20 PROBLEM — Z11.59 ENCOUNTER FOR HEPATITIS C SCREENING TEST FOR LOW RISK PATIENT: Status: ACTIVE | Noted: 2018-12-20

## 2018-12-20 PROCEDURE — 3078F DIAST BP <80 MM HG: CPT | Performed by: FAMILY MEDICINE

## 2018-12-20 PROCEDURE — 3008F BODY MASS INDEX DOCD: CPT | Performed by: FAMILY MEDICINE

## 2018-12-20 PROCEDURE — 3074F SYST BP LT 130 MM HG: CPT | Performed by: FAMILY MEDICINE

## 2018-12-20 PROCEDURE — 99214 OFFICE O/P EST MOD 30 MIN: CPT | Performed by: FAMILY MEDICINE

## 2018-12-20 RX ORDER — CLONAZEPAM 2 MG/1
2 TABLET ORAL 3 TIMES DAILY
Qty: 90 TABLET | Refills: 5 | Status: SHIPPED | OUTPATIENT
Start: 2018-12-20 | End: 2019-06-28 | Stop reason: SDUPTHER

## 2018-12-20 RX ORDER — OXYCODONE HYDROCHLORIDE 30 MG/1
30 TABLET ORAL EVERY 4 HOURS PRN
Qty: 180 TABLET | Refills: 0 | Status: SHIPPED | OUTPATIENT
Start: 2018-12-20 | End: 2019-01-18 | Stop reason: SDUPTHER

## 2018-12-20 NOTE — PROGRESS NOTES
Assessment/Plan:    No problem-specific Assessment & Plan notes found for this encounter  Diagnoses and all orders for this visit:    Chronic bronchitis, unspecified chronic bronchitis type ([facility] )  -     Hepatitis C antibody; Future  -     CBC and differential; Future  -     Comprehensive metabolic panel; Future  -     Lipid panel; Future    Malignant neoplasm of lung, unspecified laterality, unspecified part of lung ([facility])  -     Hepatitis C antibody; Future  -     CBC and differential; Future  -     Comprehensive metabolic panel; Future  -     Lipid panel; Future    Essential hypertension  -     Hepatitis C antibody; Future  -     CBC and differential; Future  -     Comprehensive metabolic panel; Future  -     Lipid panel; Future    Chronic pain syndrome  -     Hepatitis C antibody; Future  -     CBC and differential; Future  -     Comprehensive metabolic panel; Future  -     Lipid panel; Future    Screening for ischemic heart disease  -     Hepatitis C antibody; Future  -     CBC and differential; Future  -     Comprehensive metabolic panel; Future  -     Lipid panel; Future    Anxiety  -     clonazePAM (KlonoPIN) 2 mg tablet; Take 1 tablet (2 mg total) by mouth 3 (three) times a day    Pain  -     oxyCODONE (ROXICODONE) 30 MG immediate release tablet; Take 1 tablet (30 mg total) by mouth every 4 (four) hours as needed for moderate pain or severe pain for up to 30 days Max Daily Amount: 180 mg          Subjective:      Patient ID: Gisele Samson is a 66 y o  female  She is doing okay over all  She has chronic pain secondary to charcot joint  She has good pain relief and no side effects  She has a signed treatment agreement in the chart and urine toxicology consistent with regular use of her medication  She was recently called for a pill count and she was compliant and accurate  She is treated for HTN  She has no CP or SOB  She has no HA or vision changes  She has not had f/u with rad-onc  She has h/o of lung CA  She has no cough or hemoptysis  She has no weight loss  The following portions of the patient's history were reviewed and updated as appropriate:   She  has a past medical history of Malignant melanoma of skin ([place] [address] )  She   Patient Active Problem List    Diagnosis Date Noted    Screening for ischemic heart disease 12/20/2018    Swollen arm 04/12/2018    Pain 04/12/2018    Chronic pain syndrome 04/12/2018    COPD (chronic obstructive pulmonary disease) ([place] [address] ) 02/15/2017    Lung cancer ([place] [address] ) 11/18/2016    Acute exacerbation of chronic obstructive airways disease with asthma ([place] [address] ) 03/16/2015    Hypertension 03/16/2015     She  has a past surgical history that includes Arthrodesis; Total hip arthroplasty (Right); Rhinoplasty; Tonsillectomy and adenoidectomy; and Total abdominal hysterectomy  Her family history includes Heart disease in her mother; Leukemia in her father and mother  She  reports that she has been smoking  She has never used smokeless tobacco  She reports that she does not drink alcohol or use drugs    Current Outpatient Prescriptions   Medication Sig Dispense Refill    albuterol (VENTOLIN HFA) 90 mcg/act inhaler Inhale 1-2 puffs      ammonium lactate (LAC-HYDRIN) 12 % lotion APPLY TO KNUCKLES ON RIGHT HAND TWICE A DAY  0    cetirizine (ZyrTEC) 10 mg tablet Take 1 tablet (10 mg total) by mouth daily 30 tablet 5    clonazePAM (KlonoPIN) 2 mg tablet Take 1 tablet (2 mg total) by mouth 3 (three) times a day 90 tablet 5    conjugated estrogens (PREMARIN) 1 25 mg tablet Take 1 tablet (1 25 mg total) by mouth daily 30 tablet 5    docusate sodium (COLACE) 250 MG capsule Take 1 capsule by mouth 2 (two) times a day      fexofenadine (ALLEGRA) 180 MG tablet Take 1 tablet by mouth daily as needed      fluocinonide (LIDEX) 0 05 % cream   0    fluticasone (FLONASE) 50 mcg/act nasal spray 2 sprays into each nostril daily      furosemide (LASIX) 80 mg tablet Take 1 tablet (80 mg total) by mouth daily 30 tablet 5    ibuprofen (MOTRIN) 800 mg tablet Take 1 tablet (800 mg total) by mouth every 6 (six) hours as needed (prn) 100 tablet 5    levofloxacin (LEVAQUIN) 500 mg tablet take 1 tablet by mouth once daily for 10 days  0    Magnesium 250 MG TABS 1 tablet 2 (two) times a day  0    meclizine (ANTIVERT) 12 5 MG tablet Take 12 5 mg by mouth every 8 (eight) hours as needed  0    metolazone (ZAROXOLYN) 5 mg tablet TAKE 1 TABLET BY MOUTH EVERY OTHER DAY 15 tablet 5    metoprolol succinate (TOPROL-XL) 100 mg 24 hr tablet take 1/2 tablet daily 30 tablet 5    naloxegol oxalate (MOVANTIK) 25 MG tablet Take by mouth      Nutritional Supplements (BOOST HIGH PROTEIN) LIQD Take by mouth      nystatin (MYCOSTATIN) 100,000 units/mL suspension swish and swallow 1 teaspoonful by mouth three times a day  0    oxyCODONE (ROXICODONE) 30 MG immediate release tablet Take 1 tablet (30 mg total) by mouth every 4 (four) hours as needed for moderate pain or severe pain for up to 30 days Max Daily Amount: 180 mg 180 tablet 0    pantoprazole (PROTONIX) 40 mg tablet take 1 tablet by mouth once daily 30 tablet 4    pimozide (ORAP) 2 mg tablet Take 1 tablet (2 mg total) by mouth 3 (three) times a day 90 tablet 5    potassium chloride 10 % Take 15 mL (20 mEq total) by mouth daily 15ml po daily 240 mL 0    RA COL-RITE 250 MG capsule   0    senna (SENNA-LAX) 8 6 MG tablet Take 1 tablet (8 6 mg total) by mouth 2 (two) times a day 60 tablet 5    sertraline (ZOLOFT) 100 mg tablet Take 1 tablet (100 mg total) by mouth daily 30 tablet 5    simethicone (MYLICON,GAS-X) 788 MG capsule Take 1 capsule (180 mg total) by mouth 4 (four) times a day as needed for flatulence 120 capsule 0    triamcinolone (KENALOG) 0 1 % cream Apply topically Twice daily      TURMERIC PO Take by mouth       No current facility-administered medications for this visit        Current Outpatient Prescriptions on File Prior to Visit   Medication Sig    albuterol (VENTOLIN HFA) 90 mcg/act inhaler Inhale 1-2 puffs    ammonium lactate (LAC-HYDRIN) 12 % lotion APPLY TO KNUCKLES ON RIGHT HAND TWICE A DAY    cetirizine (ZyrTEC) 10 mg tablet Take 1 tablet (10 mg total) by mouth daily    conjugated estrogens (PREMARIN) 1 25 mg tablet Take 1 tablet (1 25 mg total) by mouth daily    docusate sodium (COLACE) 250 MG capsule Take 1 capsule by mouth 2 (two) times a day    fexofenadine (ALLEGRA) 180 MG tablet Take 1 tablet by mouth daily as needed    fluocinonide (LIDEX) 0 05 % cream     fluticasone (FLONASE) 50 mcg/act nasal spray 2 sprays into each nostril daily    furosemide (LASIX) 80 mg tablet Take 1 tablet (80 mg total) by mouth daily    ibuprofen (MOTRIN) 800 mg tablet Take 1 tablet (800 mg total) by mouth every 6 (six) hours as needed (prn)    levofloxacin (LEVAQUIN) 500 mg tablet take 1 tablet by mouth once daily for 10 days    Magnesium 250 MG TABS 1 tablet 2 (two) times a day    meclizine (ANTIVERT) 12 5 MG tablet Take 12 5 mg by mouth every 8 (eight) hours as needed    metolazone (ZAROXOLYN) 5 mg tablet TAKE 1 TABLET BY MOUTH EVERY OTHER DAY    metoprolol succinate (TOPROL-XL) 100 mg 24 hr tablet take 1/2 tablet daily    naloxegol oxalate (MOVANTIK) 25 MG tablet Take by mouth    Nutritional Supplements (BOOST HIGH PROTEIN) LIQD Take by mouth    nystatin (MYCOSTATIN) 100,000 units/mL suspension swish and swallow 1 teaspoonful by mouth three times a day    pantoprazole (PROTONIX) 40 mg tablet take 1 tablet by mouth once daily    pimozide (ORAP) 2 mg tablet Take 1 tablet (2 mg total) by mouth 3 (three) times a day    potassium chloride 10 % Take 15 mL (20 mEq total) by mouth daily 15ml po daily    RA COL-RITE 250 MG capsule     senna (SENNA-LAX) 8 6 MG tablet Take 1 tablet (8 6 mg total) by mouth 2 (two) times a day    sertraline (ZOLOFT) 100 mg tablet Take 1 tablet (100 mg total) by mouth daily    simethicone (MYLICON,GAS-X) 308 MG capsule Take 1 capsule (180 mg total) by mouth 4 (four) times a day as needed for flatulence    triamcinolone (KENALOG) 0 1 % cream Apply topically Twice daily    TURMERIC PO Take by mouth    [DISCONTINUED] clonazePAM (KlonoPIN) 2 mg tablet take 1 tablet by mouth three times a day    [DISCONTINUED] oxyCODONE (ROXICODONE) 30 MG immediate release tablet Take 1 tablet (30 mg total) by mouth every 4 (four) hours as needed for moderate pain or severe pain for up to 30 days Max Daily Amount: 180 mg     No current facility-administered medications on file prior to visit  She is allergic to cephalexin; ciprofloxacin; eggs or egg-derived products; other; and penicillins       Review of Systems   All other systems reviewed and are negative  Objective:      /78 (BP Location: Right arm, Patient Position: Sitting, Cuff Size: Standard)   Pulse 67   Resp 16   Ht 5' 6" (1 676 m)   Wt 63 2 kg (139 lb 6 4 oz)   SpO2 91%   BMI 22 50 kg/m²          Physical Exam   Constitutional: She is oriented to person, place, and time  She appears well-developed and well-nourished  Neck: Normal range of motion  Neck supple  Cardiovascular: Normal rate, regular rhythm, normal heart sounds and intact distal pulses  Pulmonary/Chest: Effort normal and breath sounds normal    Abdominal: Soft  Bowel sounds are normal    Musculoskeletal: Normal range of motion  Neurological: She is alert and oriented to person, place, and time  She has normal reflexes  Skin: Skin is warm and dry  Psychiatric: She has a normal mood and affect  Her behavior is normal  Judgment and thought content normal    Nursing note and vitals reviewed

## 2019-01-18 DIAGNOSIS — R52 PAIN: ICD-10-CM

## 2019-01-18 DIAGNOSIS — R10.9 ABDOMINAL PAIN, UNSPECIFIED ABDOMINAL LOCATION: Primary | ICD-10-CM

## 2019-01-18 RX ORDER — OXYCODONE HYDROCHLORIDE 30 MG/1
30 TABLET ORAL EVERY 4 HOURS PRN
Qty: 180 TABLET | Refills: 0 | Status: SHIPPED | OUTPATIENT
Start: 2019-01-18 | End: 2019-02-15 | Stop reason: SDUPTHER

## 2019-01-22 DIAGNOSIS — K21.9 GASTROESOPHAGEAL REFLUX DISEASE WITHOUT ESOPHAGITIS: ICD-10-CM

## 2019-01-22 RX ORDER — PANTOPRAZOLE SODIUM 40 MG/1
40 TABLET, DELAYED RELEASE ORAL DAILY
Qty: 30 TABLET | Refills: 5 | Status: SHIPPED | OUTPATIENT
Start: 2019-01-22

## 2019-02-10 DIAGNOSIS — I10 ESSENTIAL HYPERTENSION: Primary | ICD-10-CM

## 2019-02-11 RX ORDER — MULTIVITAMIN WITH IRON
TABLET ORAL
Qty: 60 TABLET | Refills: 5 | Status: SHIPPED | OUTPATIENT
Start: 2019-02-11

## 2019-02-15 DIAGNOSIS — R52 PAIN: ICD-10-CM

## 2019-02-18 RX ORDER — OXYCODONE HYDROCHLORIDE 30 MG/1
30 TABLET ORAL EVERY 4 HOURS PRN
Qty: 180 TABLET | Refills: 0 | Status: SHIPPED | OUTPATIENT
Start: 2019-02-18 | End: 2019-03-13 | Stop reason: SDUPTHER

## 2019-02-27 DIAGNOSIS — R60.9 EDEMA, UNSPECIFIED TYPE: ICD-10-CM

## 2019-02-27 RX ORDER — ESTROGENS, CONJUGATED 1.25 MG
TABLET ORAL
Qty: 30 TABLET | Refills: 5 | Status: SHIPPED | OUTPATIENT
Start: 2019-02-27 | End: 2019-08-26 | Stop reason: SDUPTHER

## 2019-03-13 DIAGNOSIS — R52 PAIN: ICD-10-CM

## 2019-03-13 RX ORDER — OXYCODONE HYDROCHLORIDE 30 MG/1
30 TABLET ORAL EVERY 4 HOURS PRN
Qty: 180 TABLET | Refills: 0 | Status: CANCELLED | OUTPATIENT
Start: 2019-03-13 | End: 2019-04-12

## 2019-03-14 RX ORDER — OXYCODONE HYDROCHLORIDE 30 MG/1
30 TABLET ORAL EVERY 4 HOURS PRN
Qty: 180 TABLET | Refills: 0 | Status: SHIPPED | OUTPATIENT
Start: 2019-03-14 | End: 2019-04-13

## 2019-04-15 ENCOUNTER — OFFICE VISIT (OUTPATIENT)
Dept: FAMILY MEDICINE CLINIC | Facility: CLINIC | Age: 64
End: 2019-04-15
Payer: COMMERCIAL

## 2019-04-15 VITALS
SYSTOLIC BLOOD PRESSURE: 116 MMHG | WEIGHT: 135.4 LBS | HEIGHT: 66 IN | OXYGEN SATURATION: 96 % | RESPIRATION RATE: 14 BRPM | HEART RATE: 92 BPM | DIASTOLIC BLOOD PRESSURE: 72 MMHG | BODY MASS INDEX: 21.76 KG/M2

## 2019-04-15 DIAGNOSIS — G89.4 CHRONIC PAIN SYNDROME: ICD-10-CM

## 2019-04-15 DIAGNOSIS — C34.90 MALIGNANT NEOPLASM OF LUNG, UNSPECIFIED LATERALITY, UNSPECIFIED PART OF LUNG (HCC): ICD-10-CM

## 2019-04-15 DIAGNOSIS — J42 CHRONIC BRONCHITIS, UNSPECIFIED CHRONIC BRONCHITIS TYPE (HCC): Primary | ICD-10-CM

## 2019-04-15 DIAGNOSIS — M14.60 CHARCOT'S JOINT: ICD-10-CM

## 2019-04-15 DIAGNOSIS — R05.9 COUGH: ICD-10-CM

## 2019-04-15 DIAGNOSIS — I10 ESSENTIAL HYPERTENSION: ICD-10-CM

## 2019-04-15 PROCEDURE — 3008F BODY MASS INDEX DOCD: CPT | Performed by: FAMILY MEDICINE

## 2019-04-15 PROCEDURE — 3074F SYST BP LT 130 MM HG: CPT | Performed by: FAMILY MEDICINE

## 2019-04-15 PROCEDURE — 3078F DIAST BP <80 MM HG: CPT | Performed by: FAMILY MEDICINE

## 2019-04-15 PROCEDURE — 99213 OFFICE O/P EST LOW 20 MIN: CPT | Performed by: FAMILY MEDICINE

## 2019-04-15 RX ORDER — OXYCODONE HYDROCHLORIDE 30 MG/1
30 TABLET ORAL EVERY 4 HOURS PRN
Qty: 180 TABLET | Refills: 0 | Status: SHIPPED | OUTPATIENT
Start: 2019-04-15 | End: 2019-05-07 | Stop reason: SDUPTHER

## 2019-04-15 RX ORDER — BENZONATATE 100 MG/1
100 CAPSULE ORAL 3 TIMES DAILY PRN
Qty: 20 CAPSULE | Refills: 0 | Status: SHIPPED | OUTPATIENT
Start: 2019-04-15 | End: 2019-05-12 | Stop reason: SDUPTHER

## 2019-04-24 DIAGNOSIS — F32.A DEPRESSION, UNSPECIFIED DEPRESSION TYPE: ICD-10-CM

## 2019-04-24 RX ORDER — SERTRALINE HYDROCHLORIDE 100 MG/1
TABLET, FILM COATED ORAL
Qty: 30 TABLET | Refills: 5 | Status: SHIPPED | OUTPATIENT
Start: 2019-04-24 | End: 2019-10-21 | Stop reason: SDUPTHER

## 2019-05-07 DIAGNOSIS — M14.60 CHARCOT'S JOINT: ICD-10-CM

## 2019-05-07 DIAGNOSIS — G89.4 CHRONIC PAIN SYNDROME: ICD-10-CM

## 2019-05-07 RX ORDER — OXYCODONE HYDROCHLORIDE 30 MG/1
30 TABLET ORAL EVERY 4 HOURS PRN
Qty: 180 TABLET | Refills: 0 | Status: SHIPPED | OUTPATIENT
Start: 2019-05-07 | End: 2019-06-07 | Stop reason: SDUPTHER

## 2019-05-10 DIAGNOSIS — F95.2 TOURETTE'S: ICD-10-CM

## 2019-05-10 DIAGNOSIS — J30.9 ALLERGIC RHINITIS, UNSPECIFIED SEASONALITY, UNSPECIFIED TRIGGER: ICD-10-CM

## 2019-05-10 RX ORDER — CETIRIZINE HYDROCHLORIDE 10 MG/1
TABLET ORAL
Qty: 30 TABLET | Refills: 1 | Status: SHIPPED | OUTPATIENT
Start: 2019-05-10 | End: 2019-07-05 | Stop reason: SDUPTHER

## 2019-05-10 RX ORDER — PIMOZIDE 2 MG/1
TABLET ORAL
Qty: 90 TABLET | Refills: 5 | Status: SHIPPED | OUTPATIENT
Start: 2019-05-10 | End: 2019-10-23 | Stop reason: SDUPTHER

## 2019-05-12 DIAGNOSIS — R05.9 COUGH: ICD-10-CM

## 2019-05-13 RX ORDER — BENZONATATE 100 MG/1
CAPSULE ORAL
Qty: 20 CAPSULE | Refills: 0 | Status: SHIPPED | OUTPATIENT
Start: 2019-05-13 | End: 2019-08-25 | Stop reason: SDUPTHER

## 2019-05-14 RX ORDER — LINACLOTIDE 145 UG/1
1 CAPSULE, GELATIN COATED ORAL DAILY
Refills: 0 | COMMUNITY
Start: 2019-04-30

## 2019-05-15 ENCOUNTER — OFFICE VISIT (OUTPATIENT)
Dept: FAMILY MEDICINE CLINIC | Facility: CLINIC | Age: 64
End: 2019-05-15
Payer: COMMERCIAL

## 2019-05-15 ENCOUNTER — TELEPHONE (OUTPATIENT)
Dept: FAMILY MEDICINE CLINIC | Facility: CLINIC | Age: 64
End: 2019-05-15

## 2019-05-15 VITALS
DIASTOLIC BLOOD PRESSURE: 70 MMHG | OXYGEN SATURATION: 96 % | BODY MASS INDEX: 21.92 KG/M2 | WEIGHT: 136.4 LBS | HEART RATE: 72 BPM | RESPIRATION RATE: 14 BRPM | HEIGHT: 66 IN | SYSTOLIC BLOOD PRESSURE: 116 MMHG

## 2019-05-15 DIAGNOSIS — M48.02 CERVICAL STENOSIS OF SPINE: Primary | ICD-10-CM

## 2019-05-15 PROCEDURE — 3008F BODY MASS INDEX DOCD: CPT | Performed by: FAMILY MEDICINE

## 2019-05-15 PROCEDURE — 99213 OFFICE O/P EST LOW 20 MIN: CPT | Performed by: FAMILY MEDICINE

## 2019-05-23 DIAGNOSIS — I10 ESSENTIAL HYPERTENSION: ICD-10-CM

## 2019-05-23 RX ORDER — METOPROLOL SUCCINATE 100 MG/1
TABLET, EXTENDED RELEASE ORAL
Qty: 30 TABLET | Refills: 5 | Status: SHIPPED | OUTPATIENT
Start: 2019-05-23

## 2019-06-07 DIAGNOSIS — M14.60 CHARCOT'S JOINT: ICD-10-CM

## 2019-06-07 DIAGNOSIS — G89.4 CHRONIC PAIN SYNDROME: ICD-10-CM

## 2019-06-08 RX ORDER — OXYCODONE HYDROCHLORIDE 30 MG/1
30 TABLET ORAL EVERY 4 HOURS PRN
Qty: 180 TABLET | Refills: 0 | Status: SHIPPED | OUTPATIENT
Start: 2019-06-08 | End: 2019-07-05 | Stop reason: SDUPTHER

## 2019-06-17 ENCOUNTER — TELEPHONE (OUTPATIENT)
Dept: FAMILY MEDICINE CLINIC | Facility: CLINIC | Age: 64
End: 2019-06-17

## 2019-06-17 DIAGNOSIS — H10.023 PINK EYE DISEASE OF BOTH EYES: Primary | ICD-10-CM

## 2019-06-17 RX ORDER — GENTAMICIN SULFATE 3 MG/ML
1 SOLUTION/ DROPS OPHTHALMIC EVERY 4 HOURS
Qty: 5 ML | Refills: 0 | Status: SHIPPED | OUTPATIENT
Start: 2019-06-17 | End: 2019-08-23 | Stop reason: SDUPTHER

## 2019-06-18 ENCOUNTER — TELEPHONE (OUTPATIENT)
Dept: FAMILY MEDICINE CLINIC | Facility: CLINIC | Age: 64
End: 2019-06-18

## 2019-06-18 DIAGNOSIS — R42 VERTIGO: Primary | ICD-10-CM

## 2019-06-18 RX ORDER — MECLIZINE HCL 12.5 MG/1
12.5 TABLET ORAL 3 TIMES DAILY PRN
Qty: 30 TABLET | Refills: 0 | Status: SHIPPED | OUTPATIENT
Start: 2019-06-18

## 2019-06-26 DIAGNOSIS — R60.9 EDEMA, UNSPECIFIED TYPE: ICD-10-CM

## 2019-06-26 RX ORDER — FUROSEMIDE 80 MG
TABLET ORAL
Qty: 30 TABLET | Refills: 5 | Status: SHIPPED | OUTPATIENT
Start: 2019-06-26

## 2019-06-28 DIAGNOSIS — F41.9 ANXIETY: ICD-10-CM

## 2019-06-28 RX ORDER — CLONAZEPAM 2 MG/1
TABLET ORAL
Qty: 90 TABLET | Refills: 5 | Status: SHIPPED | OUTPATIENT
Start: 2019-06-28

## 2019-07-03 ENCOUNTER — TELEPHONE (OUTPATIENT)
Dept: FAMILY MEDICINE CLINIC | Facility: CLINIC | Age: 64
End: 2019-07-03

## 2019-07-03 DIAGNOSIS — J01.00 ACUTE NON-RECURRENT MAXILLARY SINUSITIS: Primary | ICD-10-CM

## 2019-07-03 RX ORDER — AZITHROMYCIN 250 MG/1
TABLET, FILM COATED ORAL
Qty: 6 TABLET | Refills: 0 | Status: SHIPPED | OUTPATIENT
Start: 2019-07-03 | End: 2019-07-10

## 2019-07-03 RX ORDER — FLUTICASONE PROPIONATE 50 MCG
2 SPRAY, SUSPENSION (ML) NASAL DAILY
Qty: 1 BOTTLE | Refills: 5 | Status: SHIPPED | OUTPATIENT
Start: 2019-07-03

## 2019-07-05 DIAGNOSIS — M14.60 CHARCOT'S JOINT: ICD-10-CM

## 2019-07-05 DIAGNOSIS — J30.9 ALLERGIC RHINITIS, UNSPECIFIED SEASONALITY, UNSPECIFIED TRIGGER: ICD-10-CM

## 2019-07-05 DIAGNOSIS — G89.4 CHRONIC PAIN SYNDROME: ICD-10-CM

## 2019-07-05 RX ORDER — CETIRIZINE HYDROCHLORIDE 10 MG/1
TABLET ORAL
Qty: 30 TABLET | Refills: 1 | Status: SHIPPED | OUTPATIENT
Start: 2019-07-05 | End: 2019-08-30 | Stop reason: SDUPTHER

## 2019-07-05 RX ORDER — OXYCODONE HYDROCHLORIDE 30 MG/1
30 TABLET ORAL EVERY 4 HOURS PRN
Qty: 180 TABLET | Refills: 0 | Status: SHIPPED | OUTPATIENT
Start: 2019-07-05 | End: 2019-08-28 | Stop reason: SDUPTHER

## 2019-07-09 ENCOUNTER — TELEPHONE (OUTPATIENT)
Dept: FAMILY MEDICINE CLINIC | Facility: CLINIC | Age: 64
End: 2019-07-09

## 2019-08-02 ENCOUNTER — OFFICE VISIT (OUTPATIENT)
Dept: FAMILY MEDICINE CLINIC | Facility: CLINIC | Age: 64
End: 2019-08-02
Payer: COMMERCIAL

## 2019-08-02 VITALS — WEIGHT: 128 LBS | BODY MASS INDEX: 20.57 KG/M2 | HEIGHT: 66 IN

## 2019-08-02 DIAGNOSIS — M14.60 CHARCOT'S JOINT: ICD-10-CM

## 2019-08-02 DIAGNOSIS — K00.9 DENTAL ANOMALY: ICD-10-CM

## 2019-08-02 DIAGNOSIS — Z11.59 ENCOUNTER FOR HEPATITIS C SCREENING TEST FOR LOW RISK PATIENT: ICD-10-CM

## 2019-08-02 DIAGNOSIS — R60.9 EDEMA, UNSPECIFIED TYPE: Primary | ICD-10-CM

## 2019-08-02 DIAGNOSIS — G89.4 CHRONIC PAIN SYNDROME: ICD-10-CM

## 2019-08-02 DIAGNOSIS — I10 ESSENTIAL HYPERTENSION: ICD-10-CM

## 2019-08-02 PROCEDURE — 3008F BODY MASS INDEX DOCD: CPT | Performed by: FAMILY MEDICINE

## 2019-08-02 PROCEDURE — 4004F PT TOBACCO SCREEN RCVD TLK: CPT | Performed by: FAMILY MEDICINE

## 2019-08-02 PROCEDURE — 99214 OFFICE O/P EST MOD 30 MIN: CPT | Performed by: FAMILY MEDICINE

## 2019-08-02 RX ORDER — POLYETHYLENE GLYCOL 3350 17 G
POWDER IN PACKET (EA) ORAL
Refills: 0 | COMMUNITY
Start: 2019-06-28

## 2019-08-02 RX ORDER — BISACODYL 5 MG
TABLET, DELAYED RELEASE (ENTERIC COATED) ORAL
Refills: 0 | COMMUNITY
Start: 2019-06-28

## 2019-08-02 RX ORDER — POTASSIUM CHLORIDE 20 MEQ/1
20 TABLET, EXTENDED RELEASE ORAL 2 TIMES DAILY
Qty: 60 TABLET | Refills: 5 | Status: SHIPPED | OUTPATIENT
Start: 2019-08-02

## 2019-08-02 RX ORDER — OMEPRAZOLE 40 MG/1
CAPSULE, DELAYED RELEASE ORAL
Refills: 0 | COMMUNITY
Start: 2019-07-28

## 2019-08-02 NOTE — PROGRESS NOTES
Assessment/Plan:    No problem-specific Assessment & Plan notes found for this encounter  Diagnoses and all orders for this visit:    Edema, unspecified type  -     potassium chloride (K-DUR,KLOR-CON) 20 mEq tablet; Take 1 tablet (20 mEq total) by mouth 2 (two) times a day    Chronic pain syndrome  -     MRI cervical spine wo contrast; Future    Charcot's joint  -     MRI cervical spine wo contrast; Future    Dental anomaly  -     Ambulatory referral to Oral Maxillofacial Surgery; Future    Encounter for hepatitis C screening test for low risk patient  -     Hepatitis C antibody; Future    Essential hypertension  -     Comprehensive metabolic panel; Future  -     Lipid panel; Future  -     CBC and differential; Future  -     TSH, 3rd generation; Future    Other orders  -     BISACODYL 5 MG EC tablet; Take as directed  -     omeprazole (PriLOSEC) 40 MG capsule  -     RA LAXATIVE powder; take as directed for colonoscopy prep          Subjective:      Patient ID: Ashton Lanes is a 60 y o  female  Patient presents to the office for follow up  Patient is requesting MRI neck due to neck pain  She apparently has prior-authorization, but has not gone for it  MRI in 2/2018 showed extensive postoperative changes, but no changes from prior studies  She has chronic pain from her spine and takes oxycodone  Patient states she has a blockage in the pancreas and is scheduled for US in [place] next week  It is unclear if she is having an ultrasound guided biopsy  Unfortunately, I have no records from GI at [place]  She reports intolerable PO with nausea and vomiting after eating, and weight loss as a result  Patient is followed by Dr Osman Cordon and had an endoscopy performed in June which showed ulcers for which the patient is taking Protonix with minimal relief   She states that she has been having cough with mucous production for the past year and a half, worse in the AM      Patient is requesting a referral to a therapist at this time due to increased stress and pain  She is requesting referral to an oral surgeon as well  Patient has a history of Charcot's joint and states she has an appointment with the [facility] and [address] [place], [place] [address] in [place] in September  The following portions of the patient's history were reviewed and updated as appropriate:   She  has a past medical history of Malignant melanoma of skin ([facility] )  She   Patient Active Problem List    Diagnosis Date Noted    Charcot's joint 04/15/2019    Screening for ischemic heart disease 12/20/2018    Swollen arm 04/12/2018    Pain 04/12/2018    Chronic pain syndrome 04/12/2018    COPD (chronic obstructive pulmonary disease) ([facility] ) 02/15/2017    Lung cancer ([facility] ) 11/18/2016    Acute exacerbation of chronic obstructive airways disease with asthma ([facility] ) 03/16/2015    Hypertension 03/16/2015    Generalized osteoarthritis of multiple sites 03/16/2015     She  has a past surgical history that includes Arthrodesis; Total hip arthroplasty (Right); Rhinoplasty; Tonsillectomy and adenoidectomy; and Total abdominal hysterectomy  Her family history includes Heart disease in her mother; Leukemia in her father and mother  She  reports that she has been smoking  She has never used smokeless tobacco  She reports that she does not drink alcohol or use drugs    Current Outpatient Medications   Medication Sig Dispense Refill    albuterol (VENTOLIN HFA) 90 mcg/act inhaler Inhale 1-2 puffs      ammonium lactate (LAC-HYDRIN) 12 % lotion APPLY TO KNUCKLES ON RIGHT HAND TWICE A DAY  0    benzonatate (TESSALON PERLES) 100 mg capsule TAKE 1 CAPSULE BY MOUTH 3 TIMES A DAY AS NEEDED FOR COUGH 20 capsule 0    BISACODYL 5 MG EC tablet Take as directed  0    cetirizine (ZyrTEC) 10 mg tablet take 1 tablet by mouth once daily 30 tablet 1    clonazePAM (KlonoPIN) 2 mg tablet TAKE 1 TABLET BY MOUTH 3 TIMES A DAY 90 tablet 5    docusate sodium (COLACE) 250 MG capsule Take 1 capsule by mouth 2 (two) times a day      fexofenadine (ALLEGRA) 180 MG tablet Take 1 tablet by mouth daily as needed      fluocinonide (LIDEX) 0 05 % cream   0    fluticasone (FLONASE) 50 mcg/act nasal spray 2 sprays into each nostril daily 1 Bottle 5    furosemide (LASIX) 80 mg tablet Take 1 tablet (80 mg total) by mouth daily 30 tablet 5    furosemide (LASIX) 80 mg tablet take 1 tablet by mouth once daily 30 tablet 5    gentamicin (GARAMYCIN) 0 3 % ophthalmic solution Administer 1 drop to both eyes every 4 (four) hours 5 mL 0    ibuprofen (MOTRIN) 800 mg tablet Take 1 tablet (800 mg total) by mouth every 6 (six) hours as needed (prn) 100 tablet 5    levofloxacin (LEVAQUIN) 500 mg tablet take 1 tablet by mouth once daily for 10 days  0    LINZESS 145 MCG CAPS Take 1 capsule by mouth daily  0    Magnesium 250 MG TABS 1 tablet 2 (two) times a day  0    Magnesium 250 MG TABS Take one tablet twice daily 60 tablet 5    meclizine (ANTIVERT) 12 5 MG tablet Take 12 5 mg by mouth every 8 (eight) hours as needed  0    meclizine (ANTIVERT) 12 5 MG tablet Take 1 tablet (12 5 mg total) by mouth 3 (three) times a day as needed for dizziness 30 tablet 0    metolazone (ZAROXOLYN) 5 mg tablet TAKE 1 TABLET BY MOUTH EVERY OTHER DAY (Patient not taking: Reported on 4/15/2019) 15 tablet 5    metoprolol succinate (TOPROL-XL) 100 mg 24 hr tablet take 1/2 tablet daily 30 tablet 5    Nutritional Supplements (BOOST HIGH PROTEIN) LIQD Take by mouth      nystatin (MYCOSTATIN) 100,000 units/mL suspension swish and swallow 1 teaspoonful by mouth three times a day  0    omeprazole (PriLOSEC) 40 MG capsule   0    oxyCODONE (ROXICODONE) 30 MG immediate release tablet Take 1 tablet (30 mg total) by mouth every 4 (four) hours as needed for moderate painMax Daily Amount: 180 mg 180 tablet 0    pantoprazole (PROTONIX) 40 mg tablet Take 1 tablet (40 mg total) by mouth daily 30 tablet 5    pimozide (ORAP) 2 mg tablet take 1 tablet by mouth three times a day 90 tablet 5    potassium chloride (K-DUR,KLOR-CON) 20 mEq tablet Take 1 tablet (20 mEq total) by mouth 2 (two) times a day 60 tablet 5    PREMARIN 1 25 MG tablet take 1 tablet by mouth once daily 30 tablet 5    RA COL-RITE 250 MG capsule   0    RA LAXATIVE powder take as directed for colonoscopy prep  0    senna (SENNA-LAX) 8 6 MG tablet Take 1 tablet (8 6 mg total) by mouth 2 (two) times a day 60 tablet 5    sertraline (ZOLOFT) 100 mg tablet take 1 tablet by mouth once daily 30 tablet 5    simethicone (MYLICON,GAS-X) 529 MG capsule Take 1 capsule (180 mg total) by mouth 4 (four) times a day as needed for flatulence 120 capsule 0    triamcinolone (KENALOG) 0 1 % cream Apply topically Twice daily      TURMERIC PO Take by mouth       No current facility-administered medications for this visit        Current Outpatient Medications on File Prior to Visit   Medication Sig    albuterol (VENTOLIN HFA) 90 mcg/act inhaler Inhale 1-2 puffs    ammonium lactate (LAC-HYDRIN) 12 % lotion APPLY TO KNUCKLES ON RIGHT HAND TWICE A DAY    benzonatate (TESSALON PERLES) 100 mg capsule TAKE 1 CAPSULE BY MOUTH 3 TIMES A DAY AS NEEDED FOR COUGH    BISACODYL 5 MG EC tablet Take as directed    cetirizine (ZyrTEC) 10 mg tablet take 1 tablet by mouth once daily    clonazePAM (KlonoPIN) 2 mg tablet TAKE 1 TABLET BY MOUTH 3 TIMES A DAY    docusate sodium (COLACE) 250 MG capsule Take 1 capsule by mouth 2 (two) times a day    fexofenadine (ALLEGRA) 180 MG tablet Take 1 tablet by mouth daily as needed    fluocinonide (LIDEX) 0 05 % cream     fluticasone (FLONASE) 50 mcg/act nasal spray 2 sprays into each nostril daily    furosemide (LASIX) 80 mg tablet Take 1 tablet (80 mg total) by mouth daily    furosemide (LASIX) 80 mg tablet take 1 tablet by mouth once daily    gentamicin (GARAMYCIN) 0 3 % ophthalmic solution Administer 1 drop to both eyes every 4 (four) hours    ibuprofen (MOTRIN) 800 mg tablet Take 1 tablet (800 mg total) by mouth every 6 (six) hours as needed (prn)    levofloxacin (LEVAQUIN) 500 mg tablet take 1 tablet by mouth once daily for 10 days    LINZESS 145 MCG CAPS Take 1 capsule by mouth daily    Magnesium 250 MG TABS 1 tablet 2 (two) times a day    Magnesium 250 MG TABS Take one tablet twice daily    meclizine (ANTIVERT) 12 5 MG tablet Take 12 5 mg by mouth every 8 (eight) hours as needed    meclizine (ANTIVERT) 12 5 MG tablet Take 1 tablet (12 5 mg total) by mouth 3 (three) times a day as needed for dizziness    metolazone (ZAROXOLYN) 5 mg tablet TAKE 1 TABLET BY MOUTH EVERY OTHER DAY (Patient not taking: Reported on 4/15/2019)    metoprolol succinate (TOPROL-XL) 100 mg 24 hr tablet take 1/2 tablet daily    Nutritional Supplements (BOOST HIGH PROTEIN) LIQD Take by mouth    nystatin (MYCOSTATIN) 100,000 units/mL suspension swish and swallow 1 teaspoonful by mouth three times a day    omeprazole (PriLOSEC) 40 MG capsule     oxyCODONE (ROXICODONE) 30 MG immediate release tablet Take 1 tablet (30 mg total) by mouth every 4 (four) hours as needed for moderate painMax Daily Amount: 180 mg    pantoprazole (PROTONIX) 40 mg tablet Take 1 tablet (40 mg total) by mouth daily    pimozide (ORAP) 2 mg tablet take 1 tablet by mouth three times a day    PREMARIN 1 25 MG tablet take 1 tablet by mouth once daily    RA COL-RITE 250 MG capsule     RA LAXATIVE powder take as directed for colonoscopy prep    senna (SENNA-LAX) 8 6 MG tablet Take 1 tablet (8 6 mg total) by mouth 2 (two) times a day    sertraline (ZOLOFT) 100 mg tablet take 1 tablet by mouth once daily    simethicone (MYLICON,GAS-X) 040 MG capsule Take 1 capsule (180 mg total) by mouth 4 (four) times a day as needed for flatulence    triamcinolone (KENALOG) 0 1 % cream Apply topically Twice daily    TURMERIC PO Take by mouth    [DISCONTINUED] potassium chloride 10 % Take 15 mL (20 mEq total) by mouth daily 15ml po daily     No current facility-administered medications on file prior to visit  She is allergic to cephalexin; ciprofloxacin; eggs or egg-derived products; other; and penicillins       Review of Systems   Constitutional: Positive for unexpected weight change  Respiratory: Positive for cough  Gastrointestinal: Positive for abdominal pain, nausea and vomiting  Negative for constipation and diarrhea  Musculoskeletal: Positive for arthralgias, neck pain and neck stiffness  All other systems reviewed and are negative  Objective:      Ht 5' 6" (1 676 m)   Wt 58 1 kg (128 lb)   BMI 20 66 kg/m²          Physical Exam   Constitutional: She is oriented to person, place, and time  She appears well-developed and well-nourished  HENT:   Head: Normocephalic and atraumatic  Eyes: Conjunctivae and EOM are normal    Neck: Normal range of motion  Neck supple  Cardiovascular: Normal rate, regular rhythm, normal heart sounds and intact distal pulses  No murmur heard  Pulmonary/Chest: Effort normal and breath sounds normal  No respiratory distress  Abdominal: Soft  Bowel sounds are normal    Musculoskeletal: Normal range of motion  Neurological: She is alert and oriented to person, place, and time  Skin: Skin is warm and dry  Capillary refill takes less than 2 seconds  Psychiatric: She has a normal mood and affect  Her behavior is normal  Judgment and thought content normal    Nursing note and vitals reviewed

## 2019-08-23 DIAGNOSIS — H10.023 PINK EYE DISEASE OF BOTH EYES: ICD-10-CM

## 2019-08-25 DIAGNOSIS — R05.9 COUGH: ICD-10-CM

## 2019-08-26 DIAGNOSIS — R60.9 EDEMA, UNSPECIFIED TYPE: ICD-10-CM

## 2019-08-26 RX ORDER — BENZONATATE 100 MG/1
CAPSULE ORAL
Qty: 20 CAPSULE | Refills: 0 | Status: SHIPPED | OUTPATIENT
Start: 2019-08-26

## 2019-08-26 RX ORDER — ESTROGENS, CONJUGATED 1.25 MG
TABLET ORAL
Qty: 30 TABLET | Refills: 5 | Status: SHIPPED | OUTPATIENT
Start: 2019-08-26

## 2019-08-26 RX ORDER — GENTAMICIN SULFATE 3 MG/ML
SOLUTION/ DROPS OPHTHALMIC
Qty: 5 ML | Refills: 0 | Status: SHIPPED | OUTPATIENT
Start: 2019-08-26 | End: 2019-09-27 | Stop reason: SDUPTHER

## 2019-08-28 DIAGNOSIS — J42 CHRONIC BRONCHITIS, UNSPECIFIED CHRONIC BRONCHITIS TYPE (HCC): Primary | ICD-10-CM

## 2019-08-28 DIAGNOSIS — M14.60 CHARCOT'S JOINT: ICD-10-CM

## 2019-08-28 DIAGNOSIS — G89.4 CHRONIC PAIN SYNDROME: ICD-10-CM

## 2019-08-28 RX ORDER — AZITHROMYCIN 250 MG/1
TABLET, FILM COATED ORAL
Qty: 6 TABLET | Refills: 1 | Status: SHIPPED | OUTPATIENT
Start: 2019-08-28 | End: 2019-09-01

## 2019-08-29 RX ORDER — OXYCODONE HYDROCHLORIDE 30 MG/1
30 TABLET ORAL EVERY 4 HOURS PRN
Qty: 180 TABLET | Refills: 0 | Status: SHIPPED | OUTPATIENT
Start: 2019-08-29 | End: 2019-09-27 | Stop reason: SDUPTHER

## 2019-08-30 DIAGNOSIS — J30.9 ALLERGIC RHINITIS, UNSPECIFIED SEASONALITY, UNSPECIFIED TRIGGER: ICD-10-CM

## 2019-09-03 RX ORDER — CETIRIZINE HYDROCHLORIDE 10 MG/1
TABLET ORAL
Qty: 30 TABLET | Refills: 1 | Status: SHIPPED | OUTPATIENT
Start: 2019-09-03

## 2019-09-25 DIAGNOSIS — M14.60 CHARCOT'S JOINT: ICD-10-CM

## 2019-09-25 DIAGNOSIS — G89.4 CHRONIC PAIN SYNDROME: ICD-10-CM

## 2019-09-25 RX ORDER — OXYCODONE HYDROCHLORIDE 30 MG/1
30 TABLET ORAL EVERY 4 HOURS PRN
Qty: 180 TABLET | Refills: 0 | Status: CANCELLED | OUTPATIENT
Start: 2019-09-25

## 2019-09-27 DIAGNOSIS — Z51.81 THERAPEUTIC DRUG MONITORING: Primary | ICD-10-CM

## 2019-09-27 DIAGNOSIS — G89.4 CHRONIC PAIN SYNDROME: ICD-10-CM

## 2019-09-27 DIAGNOSIS — M14.60 CHARCOT'S JOINT: ICD-10-CM

## 2019-09-27 DIAGNOSIS — H10.023 PINK EYE DISEASE OF BOTH EYES: ICD-10-CM

## 2019-09-27 RX ORDER — GENTAMICIN SULFATE 3 MG/ML
SOLUTION/ DROPS OPHTHALMIC
Qty: 5 ML | Refills: 0 | Status: SHIPPED | OUTPATIENT
Start: 2019-09-27

## 2019-09-27 RX ORDER — OXYCODONE HYDROCHLORIDE 30 MG/1
30 TABLET ORAL EVERY 4 HOURS PRN
Qty: 180 TABLET | Refills: 0 | Status: SHIPPED | OUTPATIENT
Start: 2019-09-27

## 2019-09-30 PROCEDURE — 80307 DRUG TEST PRSMV CHEM ANLYZR: CPT | Performed by: NURSE PRACTITIONER

## 2019-10-01 ENCOUNTER — TELEPHONE (OUTPATIENT)
Dept: FAMILY MEDICINE CLINIC | Facility: CLINIC | Age: 64
End: 2019-10-01

## 2019-10-01 DIAGNOSIS — G89.4 CHRONIC PAIN SYNDROME: Primary | ICD-10-CM

## 2019-10-01 LAB
MISCELLANEOUS LAB TEST RESULT: NORMAL
OXYCODONE+OXYMORPHONE UR QL SCN: NEGATIVE NG/ML

## 2019-10-01 NOTE — TELEPHONE ENCOUNTER
Pt called yesterday afternoon (after she came in to give us a urine) and said that "she left all of her medications in her friends car, and her friend had to urgently go to [place], so her urine wont show oxy"

## 2019-10-07 LAB
AMPHETAMINES UR QL SCN: NEGATIVE NG/ML
BARBITURATES UR QL SCN: NEGATIVE NG/ML
BENZODIAZ UR QL: NEGATIVE NG/ML
BZE UR QL: NEGATIVE NG/ML
CANNABINOIDS UR QL SCN: POSITIVE
METHADONE UR QL SCN: NEGATIVE NG/ML
OPIATES UR QL: NEGATIVE NG/ML
PCP UR QL: NEGATIVE NG/ML
PROPOXYPH UR QL SCN: NEGATIVE NG/ML

## 2019-10-21 DIAGNOSIS — F32.A DEPRESSION, UNSPECIFIED DEPRESSION TYPE: ICD-10-CM

## 2019-10-21 RX ORDER — SERTRALINE HYDROCHLORIDE 100 MG/1
TABLET, FILM COATED ORAL
Qty: 30 TABLET | Refills: 5 | Status: SHIPPED | OUTPATIENT
Start: 2019-10-21

## 2019-10-23 DIAGNOSIS — F95.2 TOURETTE'S: ICD-10-CM

## 2019-10-24 RX ORDER — PIMOZIDE 2 MG/1
TABLET ORAL
Qty: 90 TABLET | Refills: 5 | Status: SHIPPED | OUTPATIENT
Start: 2019-10-24

## 2019-10-28 ENCOUNTER — TELEPHONE (OUTPATIENT)
Dept: FAMILY MEDICINE CLINIC | Facility: CLINIC | Age: 64
End: 2019-10-28

## 2019-10-28 NOTE — TELEPHONE ENCOUNTER
Certified Mail discharge letter sent to Jhonatan Durbin on 10/1/19, the letter was returned as unclaimed 10/9/19  Regular mail letter sent out on 10/28/19  Patient is discharged from [facility]

## 2019-10-30 DIAGNOSIS — M14.60 CHARCOT'S JOINT: ICD-10-CM

## 2019-10-30 DIAGNOSIS — G89.4 CHRONIC PAIN SYNDROME: ICD-10-CM

## 2019-10-31 RX ORDER — OXYCODONE HYDROCHLORIDE 30 MG/1
TABLET ORAL
Qty: 180 TABLET | Refills: 0 | OUTPATIENT
Start: 2019-10-31

## 2019-11-01 ENCOUNTER — TELEPHONE (OUTPATIENT)
Dept: FAMILY MEDICINE CLINIC | Facility: CLINIC | Age: 64
End: 2019-11-01

## 2019-11-01 NOTE — TELEPHONE ENCOUNTER
[facility] CALLED, STATES PT IS NOW CALLING THEIR OFFICE TO REQUEST PAIN MEDS  INFORMED THEM SHE BROKE HER NARCOTIC AGREEMENT AND FAILED HER UDS SO SHE WILL NOT BE GIVEN MEDS  PT WAS TOLD BY THEM THEY WILL NOT PRESCRIBE MEDS EITHER AND IF SHE IS HAVING PAIN WILL NEED TO SCHEDULE APPT WITH THEM

## 2019-11-12 DIAGNOSIS — R52 PAIN: ICD-10-CM

## 2019-11-14 RX ORDER — IBUPROFEN 800 MG/1
TABLET ORAL
Qty: 100 TABLET | Refills: 5 | OUTPATIENT
Start: 2019-11-14

## 2019-11-19 DIAGNOSIS — J30.9 ALLERGIC RHINITIS, UNSPECIFIED SEASONALITY, UNSPECIFIED TRIGGER: ICD-10-CM

## 2019-11-19 RX ORDER — CETIRIZINE HYDROCHLORIDE 10 MG/1
TABLET ORAL
Qty: 30 TABLET | Refills: 1 | OUTPATIENT
Start: 2019-11-19

## 2019-11-23 ENCOUNTER — TELEPHONE (OUTPATIENT)
Dept: OTHER | Facility: OTHER | Age: 64
End: 2019-11-23

## 2019-11-23 NOTE — TELEPHONE ENCOUNTER
Jamie Vargas 1955  CONFIDENTIALTY NOTICE: This fax transmission is intended only for the addressee  It contains information that is legally privileged,  confidential or otherwise protected from use or disclosure  If you are not the intended recipient, you are strictly prohibited from reviewing,  disclosing, copying using or disseminating any of this information or taking any action in reliance on or regarding this information  If you have  received this fax in error, please notify us immediately by telephone so that we can arrange for its return to us  Page:   Call Id: [id number]  Health Call  Standard Call Report  Health Call  Patient Name: Michelle Smith  Gender: Female  : 1955  Age: 60 Y 5 M 4 D  Return Phone  Number: [phone] (Home)  Address: [address] [place] [address]  City/State/Zip: [place] [id number]  [facility]  Practice Charged:  Physician:  [facility] Name:  Relationship To  Patient: Self  Return Phone Number: [phone] (Home)  Presenting Problem: "I have the flu  I am running a fever  I am disabled and do not have any  transportation today "  Service Type: Triage  Charged Service 1: Messages  Pharmacy Name and  Number:  Nurse Assessment  Protocols  Protocol Title Nurse Date/Time  Disp  Time Disposition Final User  2019 10:33:28 AM Send to Follow Up Pedro Verdin RN, Petty Hood  2019 11:16:19 AM Close Yes Henry Morales RN, Petty Hood  Comments  User: Carmelita Sherman RN Date/Time: 2019 10:32:24 AM  Attempted to call patient back  Rings several times and then goes to   Message left for patient to call back  Noticed in chart,  that on 10/28/19, patient was discharged from the practice  When I attempt a second calll back, I will inform her  User: Carmelita Shermna RN Date/Time: 2019 11:16:09 AM  Called and spoke to patient   Informed patient that based on notes from chart, patient is no longer a patient and that she was sent  a certified letter as well as a letter in the mail  Patient started yelling, getting very upset, stated that she never received a letter and that the office should have called but then stated she lost her phone,  cursed , and stated that she would call  the office on Monday  I advised patient that based on symptoms, she should go to [facility] or ER  Patient started screaming, "I don't  drive and I don't have help " I then recommended calling an ambulance and patient stated, "The hospitals up here all think I'm a drug addict " I tried explaining that she was going for flu / cough symptoms and patient stated, "It doesn't matter  I'm not going anywhere  I then informed her that I had to hang up  Patient stated, "whatever" and hung up

## 2019-12-17 DIAGNOSIS — R60.9 EDEMA, UNSPECIFIED TYPE: ICD-10-CM

## 2019-12-17 RX ORDER — FUROSEMIDE 80 MG
TABLET ORAL
Qty: 30 TABLET | Refills: 0 | OUTPATIENT
Start: 2019-12-17

## 2020-01-25 DIAGNOSIS — F41.9 ANXIETY: ICD-10-CM

## 2020-01-26 RX ORDER — CLONAZEPAM 2 MG/1
TABLET ORAL
Qty: 90 TABLET | Refills: 0 | OUTPATIENT
Start: 2020-01-26

## 2020-03-13 DIAGNOSIS — R60.9 EDEMA, UNSPECIFIED TYPE: ICD-10-CM

## 2020-03-15 RX ORDER — ESTROGENS, CONJUGATED 1.25 MG
TABLET ORAL
Qty: 30 TABLET | Refills: 5 | OUTPATIENT
Start: 2020-03-15

## 2020-04-07 DIAGNOSIS — R60.9 EDEMA, UNSPECIFIED TYPE: ICD-10-CM

## 2020-04-07 RX ORDER — ESTROGENS, CONJUGATED 1.25 MG
TABLET ORAL
Qty: 30 TABLET | Refills: 5 | OUTPATIENT
Start: 2020-04-07

## 2020-05-23 DIAGNOSIS — R60.9 EDEMA, UNSPECIFIED TYPE: ICD-10-CM

## 2020-05-26 RX ORDER — ESTROGENS, CONJUGATED 1.25 MG
TABLET ORAL
Qty: 30 TABLET | Refills: 5 | OUTPATIENT
Start: 2020-05-26

## 2020-05-29 ENCOUNTER — TELEPHONE (OUTPATIENT)
Dept: OTHER | Facility: OTHER | Age: 65
End: 2020-05-29

## 2020-10-26 ENCOUNTER — TELEPHONE (OUTPATIENT)
Dept: FAMILY MEDICINE CLINIC | Facility: CLINIC | Age: 65
End: 2020-10-26